# Patient Record
Sex: FEMALE | Race: WHITE | NOT HISPANIC OR LATINO | Employment: FULL TIME | URBAN - METROPOLITAN AREA
[De-identification: names, ages, dates, MRNs, and addresses within clinical notes are randomized per-mention and may not be internally consistent; named-entity substitution may affect disease eponyms.]

---

## 2017-03-07 ENCOUNTER — GENERIC CONVERSION - ENCOUNTER (OUTPATIENT)
Dept: OTHER | Facility: OTHER | Age: 57
End: 2017-03-07

## 2017-04-12 ENCOUNTER — GENERIC CONVERSION - ENCOUNTER (OUTPATIENT)
Dept: OTHER | Facility: OTHER | Age: 57
End: 2017-04-12

## 2017-05-30 ENCOUNTER — ALLSCRIPTS OFFICE VISIT (OUTPATIENT)
Dept: OTHER | Facility: OTHER | Age: 57
End: 2017-05-30

## 2017-05-30 DIAGNOSIS — Z86.2 PERSONAL HISTORY OF DISEASES OF THE BLOOD AND BLOOD-FORMING ORGANS AND CERTAIN DISORDERS INVOLVING THE IMMUNE MECHANISM: ICD-10-CM

## 2017-05-30 DIAGNOSIS — Z86.59 PERSONAL HISTORY OF OTHER MENTAL AND BEHAVIORAL DISORDERS: ICD-10-CM

## 2017-05-30 DIAGNOSIS — E78.00 PURE HYPERCHOLESTEROLEMIA: ICD-10-CM

## 2017-05-30 DIAGNOSIS — J45.990 EXERCISE-INDUCED BRONCHOSPASM: ICD-10-CM

## 2017-05-30 DIAGNOSIS — F41.9 ANXIETY DISORDER: ICD-10-CM

## 2017-05-30 DIAGNOSIS — J30.9 ALLERGIC RHINITIS: ICD-10-CM

## 2017-06-10 ENCOUNTER — LAB CONVERSION - ENCOUNTER (OUTPATIENT)
Dept: OTHER | Facility: OTHER | Age: 57
End: 2017-06-10

## 2017-06-10 LAB
CHOLEST SERPL-MCNC: 228 MG/DL (ref 125–200)
CHOLEST/HDLC SERPL: 3.2 (CALC)
HDLC SERPL-MCNC: 72 MG/DL
LDL CHOLESTEROL (HISTORICAL): 139 MG/DL (CALC)
NON-HDL-CHOL (CHOL-HDL) (HISTORICAL): 156 MG/DL (CALC)
TRIGL SERPL-MCNC: 85 MG/DL

## 2017-06-12 ENCOUNTER — LAB CONVERSION - ENCOUNTER (OUTPATIENT)
Dept: OTHER | Facility: OTHER | Age: 57
End: 2017-06-12

## 2017-06-12 LAB
A/G RATIO (HISTORICAL): 1.6 (CALC) (ref 1–2.5)
ALBUMIN SERPL BCP-MCNC: 4 G/DL (ref 3.6–5.1)
ALP SERPL-CCNC: 71 U/L (ref 33–130)
ALT SERPL W P-5'-P-CCNC: 13 U/L (ref 6–29)
AST SERPL W P-5'-P-CCNC: 16 U/L (ref 10–35)
BASOPHILS # BLD AUTO: 0.5 %
BASOPHILS # BLD AUTO: 20 CELLS/UL (ref 0–200)
BILIRUB SERPL-MCNC: 0.4 MG/DL (ref 0.2–1.2)
BUN SERPL-MCNC: 10 MG/DL (ref 7–25)
BUN/CREA RATIO (HISTORICAL): NORMAL (CALC) (ref 6–22)
CALCIUM SERPL-MCNC: 8.9 MG/DL (ref 8.6–10.4)
CHLORIDE SERPL-SCNC: 102 MMOL/L (ref 98–110)
CHOLEST SERPL-MCNC: 228 MG/DL (ref 125–200)
CHOLEST/HDLC SERPL: 3.2 (CALC)
CO2 SERPL-SCNC: 29 MMOL/L (ref 20–31)
CREAT SERPL-MCNC: 0.77 MG/DL (ref 0.5–1.05)
DEPRECATED RDW RBC AUTO: 15.4 % (ref 11–15)
EGFR AFRICAN AMERICAN (HISTORICAL): 101 ML/MIN/1.73M2
EGFR-AMERICAN CALC (HISTORICAL): 88 ML/MIN/1.73M2
EOSINOPHIL # BLD AUTO: 112 CELLS/UL (ref 15–500)
EOSINOPHIL # BLD AUTO: 2.8 %
FERRITIN SERPL-MCNC: 15 NG/ML (ref 10–232)
GAMMA GLOBULIN (HISTORICAL): 2.5 G/DL (CALC) (ref 1.9–3.7)
GLUCOSE (HISTORICAL): 81 MG/DL (ref 65–99)
HCT VFR BLD AUTO: 39.8 % (ref 35–45)
HDLC SERPL-MCNC: 72 MG/DL
HGB BLD-MCNC: 12.9 G/DL (ref 11.7–15.5)
IRON SATN MFR SERPL: 31 % (CALC) (ref 11–50)
IRON SERPL-MCNC: 121 MCG/DL (ref 45–160)
LDL CHOLESTEROL (HISTORICAL): 139 MG/DL (CALC)
LYMPHOCYTES # BLD AUTO: 1136 CELLS/UL (ref 850–3900)
LYMPHOCYTES # BLD AUTO: 28.4 %
MCH RBC QN AUTO: 28.6 PG (ref 27–33)
MCHC RBC AUTO-ENTMCNC: 32.5 G/DL (ref 32–36)
MCV RBC AUTO: 87.9 FL (ref 80–100)
MONOCYTES # BLD AUTO: 316 CELLS/UL (ref 200–950)
MONOCYTES (HISTORICAL): 7.9 %
NEUTROPHILS # BLD AUTO: 2416 CELLS/UL (ref 1500–7800)
NEUTROPHILS # BLD AUTO: 60.4 %
NON-HDL-CHOL (CHOL-HDL) (HISTORICAL): 156 MG/DL (CALC)
PLATELET # BLD AUTO: 166 THOUSAND/UL (ref 140–400)
PMV BLD AUTO: 10.4 FL (ref 7.5–12.5)
POTASSIUM SERPL-SCNC: 4.1 MMOL/L (ref 3.5–5.3)
RBC # BLD AUTO: 4.52 MILLION/UL (ref 3.8–5.1)
RBC MORPHOLOGY (HISTORICAL): ABNORMAL
SODIUM SERPL-SCNC: 138 MMOL/L (ref 135–146)
TIBC SERPL-MCNC: 393 MCG/DL (CALC) (ref 250–450)
TOTAL PROTEIN (HISTORICAL): 6.5 G/DL (ref 6.1–8.1)
TRIGL SERPL-MCNC: 85 MG/DL
WBC # BLD AUTO: 4 THOUSAND/UL (ref 3.8–10.8)

## 2017-06-13 ENCOUNTER — GENERIC CONVERSION - ENCOUNTER (OUTPATIENT)
Dept: OTHER | Facility: OTHER | Age: 57
End: 2017-06-13

## 2017-09-22 ENCOUNTER — GENERIC CONVERSION - ENCOUNTER (OUTPATIENT)
Dept: OTHER | Facility: OTHER | Age: 57
End: 2017-09-22

## 2017-10-23 ENCOUNTER — ALLSCRIPTS OFFICE VISIT (OUTPATIENT)
Dept: OTHER | Facility: OTHER | Age: 57
End: 2017-10-23

## 2017-10-23 DIAGNOSIS — Z86.2 PERSONAL HISTORY OF DISEASES OF THE BLOOD AND BLOOD-FORMING ORGANS AND CERTAIN DISORDERS INVOLVING THE IMMUNE MECHANISM (CODE): ICD-10-CM

## 2017-10-24 NOTE — PROGRESS NOTES
Assessment  1  Asthma, exercise induced (493 81) (J45 990)   2  Flu vaccine need (V04 81) (Z23)   3  Anxiety (300 00) (F41 9)   4  Allergic rhinitis (477 9) (J30 9)   · sees ENT, Dr Mike Amado & on immunotherapy    Plan  History of anemia, Iron deficiency anemia, unspecified iron deficiency    · (1) CBC/PLT/DIFF; Status:Active; Requested TUQ:[id number];     Discussion/Summary  Discussion Summary:   1  blood testkeep up good workflu vaccine todayreturn to office in 2018  Counseling Documentation With Imm: The patient was counseled regarding instructions for management,-- patient and family education,-- impressions,-- risks and benefits of treatment options  Medication SE Review and Pt Understands Tx: Possible side effects of new medications were reviewed with the patient/guardian today  The treatment plan was reviewed with the patient/guardian  The patient/guardian understands and agrees with the treatment plan      Chief Complaint  Chief Complaint Chronic Condition [place]: Patient is here today for follow up of chronic conditions described in HPI  History of Present Illness  HPI: 57 y/o F here for follow up  above - reviewed meds with patientcolonoscopy and found several polyps ,due for f/u in 2 years  lately and lost weight, looking to lose moreasthma sx & taking singulair without SE  flu vaccine today  any bleeding sx except missed 3 periods in a row/dipesh -menopausal and having bleeding with sex  Seeing [facility] for work up of this   sertraline for 1 month, was weaning off but her father-in-law  and she re-started rxxanax only as needed but not recently, had recent refill  any other complaints      Review of Systems  Complete-Female:   Constitutional: no fever  Eyes: no eyesight problems  The patient presents with complaints of nasal discharge  Symptoms are improving  Cardiovascular: no chest pain  Respiratory: no cough  Gastrointestinal: no abdominal pain  Genitourinary: no dysuria  Integumentary: no rashes  Neurological: no confusion  Psychiatric: no depression--    The patient presents with complaints of anxiety  Symptoms are improved by medications  Symptoms are improving  ROS Reviewed:   ROS reviewed  Active Problems  1  Achilles tendinitis of right lower extremity (726 71) (M76 61)   2  Allergic rhinitis (477 9) (J30 9)   3  Anxiety (300 00) (F41 9)   4  Asthma, exercise induced (493 81) (J45 990)   5  BMI 32 0-32 9,adult (V85 32) (Z68 32)   6  Cervical cancer screening (V76 2) (Z12 4)   7  Eczema of both hands (692 9) (L30 9)   8  Encounter for screening mammogram for breast cancer (V76 12) (Z12 31)   9  Hand cramp (729 82) (R25 2)   10  History of anemia (V12 3) (Z86 2)   11  History of depression (V11 8) (Z86 59)   12  History of oral aphthous ulcers (V12 79) (Z87 19)   13  History of sinusitis (V12 69) (Z87 09)   14  Hypercholesterolemia (272 0) (E78 00)   15  Iron deficiency anemia, unspecified iron deficiency   16  Joint stiffness of hand (719 54) (M25 649)   17  Leukopenia (288 50) (D72 819)   18  Need for prophylactic vaccination and inoculation against influenza (V04 81) (Z23)   19  Other specified menopausal and perimenopausal disorders (627 8) (N95 8)   20  Overweight (278 02) (E66 3)   21  History of Pruritus (698 9) (L29 9)   22  Right shoulder pain (719 41) (M25 511)   23  Screening for breast cancer (V76 10) (Z12 31)   24  Sprain of right ankle, unspecified ligament, initial encounter (845 00) (S93 401A)   25  History of Stressful life event affecting family (V61 09) (Z63 79)   32  Vitamin D insufficiency (268 9) (E55 9)   27  Witnessed apneic spells (786 03) (R06 81)    Past Medical History  1  History of Daytime sleepiness (780 54) (R40 0)   2  History of acute bronchitis (V12 69) (Z87 09)   3  History of gastritis (V12 79) (Z87 19)   4  History of migraine (V12 49) (Z86 69)   5  History of oral aphthous ulcers (V12 79) (Z87 19)   6   History of sinusitis (V12 69) (Z87 09)   7  History of Joint pain, knee (719 46) (M25 569)   8  Need for prophylactic vaccination and inoculation against influenza (V04 81) (Z23)   9  Other specified menopausal and perimenopausal disorders (627 8) (N95 8)   10  History of Pain in joint of right shoulder region (719 41) (M25 511)   11  History of Pruritus (698 9) (L29 9)   12  History of Snoring (786 09) (R06 83)   13  History of Tingling (782 0) (R20 2)   14  Urinary tract infection (599 0) (N39 0)   15  History of Wheezing (786 07) (R06 2)  Active Problems And Past Medical History Reviewed: The active problems and past medical history were reviewed and updated today  Surgical History  1  History of Arthroscopy Shoulder   2  History of Colonoscopy (Fiberoptic)   3  History of Diagnostic Esophagogastroduodenoscopy   4  History of Nasal Septal Deviation Repair   5  History of Shoulder Surgery   6  History of Tonsillectomy With Adenoidectomy    Family History  Mother    1  Family history of colon cancer (V16 0) (Z80 0)  Father    2  Family history of Carotid Artery Stenosis   3  Family history of Hypertension (V17 49)  Sister    4  Family history of Panic Disorder Without Agoraphobia    Social History   · Being A Social Drinker   · Marital History - Currently    · Never a smoker   · Occupation:   · Rarely consumes alcohol (V49 89) (Z78 9)   · History of Stressful life event affecting family (V61 09) (Z63 79)  Social History Reviewed: The social history was reviewed and updated today  Current Meds   1  LORazepam 0 5 MG Oral Tablet; TAKE 0 5 TABLET Daily as directed; Therapy: 07UDD7469 to (Evaluate:20Nov2017); Last Rx:11Oct2017 Ordered   2  Montelukast Sodium 10 MG Oral Tablet; TAKE 1 TABLET DAILY; Therapy: 03BSM7182 to (Evaluate:26Nov2017); Last Rx:[id number] Ordered   3  ProAir  (90 Base) MCG/ACT Inhalation Aerosol Solution; INHALE 2 PUFFS 15 MINUTES PRIOR   TO EXERCISE;    Therapy: 08KWK1981 to (Last :59FIO5186)  Requested for: 25ZYZ5641 Ordered   4  Rizatriptan Benzoate 10 MG Oral Tablet Disintegrating; PLACE 1 TABLET ON TONGUE AT ONSET OF   MIGRAINE  MAY REPEAT ONE IN 2 TO4 HOURS, MAXIMUM 2 PER DAY; Therapy: 66OXR5851 to (Last Rx:62Etx2142)  Requested for: 99Zki2782 Ordered   5  Sertraline HCl - 25 MG Oral Tablet; Take 1 tablet daily; Therapy: 28RCT5136 to (53 459 91 54)  Requested for: 84Jrn3359; Last Rx:[id number]   Ordered  Medication List Reviewed: The medication list was reviewed and updated today  Allergies  1  No Known Drug Allergies    Vitals  Vital Signs    Recorded: 23Oct2017 11:07AM   Temperature 98 5 F   Heart Rate 88   Respiration 18   Systolic 402   Diastolic 82   Height 5 ft 3 in   Weight 168 lb 6 oz   BMI Calculated 29 83   BSA Calculated 1 8   O2 Saturation 98     Physical Exam    Constitutional   General appearance: No acute distress, well appearing and well nourished  Eyes   Pupils and irises: Equal, round, reactive to light  Ears, Nose, Mouth, and Throat   Oropharynx: Normal with no erythema, edema, exudate or lesions  Pulmonary   Respiratory effort: No increased work of breathing or signs of respiratory distress  Auscultation of lungs: Clear to auscultation  Cardiovascular   Auscultation of heart: Normal rate and rhythm, normal S1 and S2, no murmurs  Examination of extremities for edema and/or varicosities: Normal     Abdomen   Abdomen: Non-tender, no masses      Psychiatric   Judgment and insight: Normal     Mood and affect: Normal        Results/Data  COLONOSCOPY 22Sep2017 12:00AM Flonnie Prescott     Test Name Result Flag Reference   Colonoscopy      7 polyps due for repeat 2020     Summary / No summary entered :      No summary entered  Documents attached :      Seth Mccoy; Enc: [id number] - Image Encounter - Flonnie Prescott -      (Internal Medicine) (Result Document)  Provider Comments  Provider Comments:   repeat CBC in november if still anemic, may need further work up  3/2018 or prn      Future Appointments    Date/Time Provider Specialty Site   04/23/2018 08:00 AM Judah Cano DO Internal Medicine [address] [facility] INTERNAL MED     Signatures   Electronically signed by : Oma Hernández DO; Oct 23 2017 12:18PM EST                       (Author)

## 2017-12-04 ENCOUNTER — GENERIC CONVERSION - ENCOUNTER (OUTPATIENT)
Dept: OTHER | Facility: OTHER | Age: 57
End: 2017-12-04

## 2018-01-12 NOTE — RESULT NOTES
Message   let patient know - shoulder x-ray looks fine, recommend to continue with current treatment plan     Verified Results  * XR SHOULDER 2+ VIEW RIGHT [id number] 12:07PM [facility] [place] [facility] Order Number: [id number]   Performing Comments: right shoulder pain for weeks, suspect rotator cuff injury     Test Name Result Flag Reference   XR SHOULDER 2+ VW RIGHT (Report)     RIGHT SHOULDER     INDICATION: Posterior pain 3 months     COMPARISON: None     VIEWS: 3; 3 images     FINDINGS:     There is no acute fracture or dislocation  Postsurgical changes are seen to the distal clavicle  The glenohumeral joint is unremarkable  No lytic or blastic lesions are seen  Soft tissues are unremarkable  IMPRESSION:     No acute osseous abnormality         Workstation performed: [id number]     Signed by:   Fuentes Saab MD   3/11/16

## 2018-01-14 VITALS
DIASTOLIC BLOOD PRESSURE: 82 MMHG | BODY MASS INDEX: 29.84 KG/M2 | HEIGHT: 63 IN | TEMPERATURE: 98.5 F | HEART RATE: 88 BPM | OXYGEN SATURATION: 98 % | RESPIRATION RATE: 18 BRPM | SYSTOLIC BLOOD PRESSURE: 120 MMHG | WEIGHT: 168.38 LBS

## 2018-01-14 NOTE — RESULT NOTES
Verified Results  COLONOSCOPY [id number] 12:00AM Leydi Velásquez     Test Name Result Flag Reference   Colonoscopy      7 polyps due for repeat 2020      Summary / No summary entered :      No summary entered   Documents attached :      Chris Gonzales; Enc: [id number] - Image Encounter -      Leydi Velásquez - (Internal Medicine) (Result Document)

## 2018-01-15 VITALS
HEART RATE: 82 BPM | BODY MASS INDEX: 32.09 KG/M2 | WEIGHT: 181.13 LBS | DIASTOLIC BLOOD PRESSURE: 80 MMHG | OXYGEN SATURATION: 98 % | SYSTOLIC BLOOD PRESSURE: 122 MMHG | TEMPERATURE: 97.9 F | RESPIRATION RATE: 18 BRPM | HEIGHT: 63 IN

## 2018-01-16 NOTE — RESULT NOTES
Verified Results  (1) COMPREHENSIVE METABOLIC PANEL [id number] 01:76NQ Taco Engle     Test Name Result Flag Reference   GLUCOSE 81 mg/dL  65-99   Fasting reference interval   UREA NITROGEN (BUN) 10 mg/dL  7-25   CREATININE 0 77 mg/dL  0 50-1 05   For patients >52years of age, the reference limit  for Creatinine is approximately 13% higher for people  identified as -American  eGFR NON-AFR   AMERICAN 88 mL/min/1 73m2  > OR = 60   eGFR AFRICAN AMERICAN 101 mL/min/1 73m2  > OR = 60   BUN/CREATININE RATIO   4-68   NOT APPLICABLE (calc)   SODIUM 138 mmol/L  135-146   POTASSIUM 4 1 mmol/L  3 5-5 3   CHLORIDE 102 mmol/L     CARBON DIOXIDE 29 mmol/L  20-31   CALCIUM 8 9 mg/dL  8 6-10 4   PROTEIN, TOTAL 6 5 g/dL  6 1-8 1   ALBUMIN 4 0 g/dL  3 6-5 1   GLOBULIN 2 5 g/dL (calc)  1 9-3 7   ALBUMIN/GLOBULIN RATIO 1 6 (calc)  1 0-2 5   BILIRUBIN, TOTAL 0 4 mg/dL  0 2-1 2   ALKALINE PHOSPHATASE 71 U/L     AST 16 U/L  10-35   ALT 13 U/L  6-29     (1) IRON SATURATION %, TIBC [id number] 07:27AM Naresh Diamond     Test Name Result Flag Reference   IRON, TOTAL 121 mcg/dL     IRON BINDING CAPACITY 393 mcg/dL (calc)  250-450   % SATURATION 31 % (calc)  11-50     (1) CBC/PLT/DIFF [id number] 07:27AM Naresh Diamond     Test Name Result Flag Reference   WHITE BLOOD CELL COUNT 4 0 Thousand/uL  3 8-10 8   RED BLOOD CELL COUNT 4 52 Million/uL  3 80-5 10   HEMOGLOBIN 12 9 g/dL  11 7-15 5   HEMATOCRIT 39 8 %  35 0-45 0   MCV 87 9 fL  80 0-100 0   MCH 28 6 pg  27 0-33 0   MCHC 32 5 g/dL  32 0-36 0   RDW 15 4 % H 11 0-15 0   PLATELET COUNT 758 Thousand/uL  140-400   ABSOLUTE NEUTROPHILS 2416 cells/uL  6487-1199   ABSOLUTE LYMPHOCYTES 1136 cells/uL  850-3900   ABSOLUTE MONOCYTES 316 cells/uL  200-950   ABSOLUTE EOSINOPHILS 112 cells/uL     ABSOLUTE BASOPHILS 20 cells/uL  0-200   NEUTROPHILS 60 4 %     LYMPHOCYTES 28 4 %     MONOCYTES 7 9 %     EOSINOPHILS 2 8 %     BASOPHILS 0 5 %     CBC MORPHOLOGY   NORMAL   Red cell morphology appears unremarkable   MPV 10 4 fL  7 5-12 5     (Q) LIPID PANEL WITH REFLEX TO DIRECT LDL [id number] 07:27AM Ashley Siva     Test Name Result Flag Reference   CHOLESTEROL, TOTAL 228 mg/dL H 125-200   HDL CHOLESTEROL 72 mg/dL  > OR = 46   TRIGLICERIDES 85 mg/dL  <237   LDL-CHOLESTEROL 139 mg/dL (calc) H <130   Desirable range <100 mg/dL for patients with CHD or  diabetes and <70 mg/dL for diabetic patients with  known heart disease  CHOL/HDLC RATIO 3 2 (calc)  < OR = 5 0   NON HDL CHOLESTEROL 156 mg/dL (calc)     Target for non-HDL cholesterol is 30 mg/dL higher than   LDL cholesterol target  (Q) LIPID PANEL WITH REFLEX TO DIRECT LDL [id number] 07:27AM Ashley Siva     Test Name Result Flag Reference   CHOLESTEROL, TOTAL 228 mg/dL H 125-200   HDL CHOLESTEROL 72 mg/dL  > OR = 46   TRIGLICERIDES 85 mg/dL  <744   LDL-CHOLESTEROL 139 mg/dL (calc) H <130   Desirable range <100 mg/dL for patients with CHD or  diabetes and <70 mg/dL for diabetic patients with  known heart disease  CHOL/HDLC RATIO 3 2 (calc)  < OR = 5 0   NON HDL CHOLESTEROL 156 mg/dL (calc)     Target for non-HDL cholesterol is 30 mg/dL higher than   LDL cholesterol target       (1) OP SHERRI FERRITIN [id number] 07:27AM Ashley Paniagua   REPORT COMMENT:  FASTING:YES     Test Name Result Flag Reference   FERRITIN 15 ng/mL

## 2018-01-24 VITALS
TEMPERATURE: 98 F | HEIGHT: 63 IN | DIASTOLIC BLOOD PRESSURE: 76 MMHG | BODY MASS INDEX: 30.12 KG/M2 | HEART RATE: 76 BPM | RESPIRATION RATE: 18 BRPM | WEIGHT: 170 LBS | OXYGEN SATURATION: 98 % | SYSTOLIC BLOOD PRESSURE: 122 MMHG

## 2018-02-03 LAB
BASOPHILS # BLD AUTO: 42 CELLS/UL (ref 0–200)
BASOPHILS NFR BLD AUTO: 1.2 %
EOSINOPHIL # BLD AUTO: 123 CELLS/UL (ref 15–500)
EOSINOPHIL NFR BLD AUTO: 3.5 %
ERYTHROCYTE [DISTWIDTH] IN BLOOD BY AUTOMATED COUNT: 12.6 % (ref 11–15)
HCT VFR BLD AUTO: 43.5 % (ref 35–45)
HGB BLD-MCNC: 14.1 G/DL (ref 11.7–15.5)
LYMPHOCYTES # BLD AUTO: 1397 CELLS/UL (ref 850–3900)
LYMPHOCYTES NFR BLD AUTO: 39.9 %
MCH RBC QN AUTO: 29 PG (ref 27–33)
MCHC RBC AUTO-ENTMCNC: 32.4 G/DL (ref 32–36)
MCV RBC AUTO: 89.5 FL (ref 80–100)
MONOCYTES # BLD AUTO: 273 CELLS/UL (ref 200–950)
MONOCYTES NFR BLD AUTO: 7.8 %
NEUTROPHILS # BLD AUTO: 1666 CELLS/UL (ref 1500–7800)
NEUTROPHILS NFR BLD AUTO: 47.6 %
PLATELET # BLD AUTO: 172 THOUSAND/UL (ref 140–400)
PMV BLD REES-ECKER: 12.9 FL (ref 7.5–12.5)
RBC # BLD AUTO: 4.86 MILLION/UL (ref 3.8–5.1)
WBC # BLD AUTO: 3.5 THOUSAND/UL (ref 3.8–10.8)

## 2018-02-07 ENCOUNTER — TELEPHONE (OUTPATIENT)
Dept: INTERNAL MEDICINE CLINIC | Facility: CLINIC | Age: 58
End: 2018-02-07

## 2018-02-07 NOTE — TELEPHONE ENCOUNTER
----- Message from Julito Denton DO sent at 2/7/2018 10:14 AM EST -----  Blood work is back and blood count looks fine/no anemia    Let's f/u at scheduled appt in April

## 2018-03-08 DIAGNOSIS — F41.9 ANXIETY: Primary | ICD-10-CM

## 2018-03-08 RX ORDER — SERTRALINE HYDROCHLORIDE 25 MG/1
TABLET, FILM COATED ORAL
Qty: 90 TABLET | Refills: 1 | Status: SHIPPED | OUTPATIENT
Start: 2018-03-08 | End: 2018-08-15 | Stop reason: SDUPTHER

## 2018-04-10 DIAGNOSIS — G43.909 MIGRAINE WITHOUT STATUS MIGRAINOSUS, NOT INTRACTABLE, UNSPECIFIED MIGRAINE TYPE: Primary | ICD-10-CM

## 2018-04-10 RX ORDER — RIZATRIPTAN BENZOATE 10 MG/1
TABLET, ORALLY DISINTEGRATING ORAL
Qty: 27 TABLET | Refills: 1 | Status: SHIPPED | OUTPATIENT
Start: 2018-04-10 | End: 2018-11-26 | Stop reason: SDUPTHER

## 2018-04-21 PROBLEM — M51.26 LUMBAR HERNIATED DISC: Status: ACTIVE | Noted: 2017-12-04

## 2018-04-23 ENCOUNTER — OFFICE VISIT (OUTPATIENT)
Dept: INTERNAL MEDICINE CLINIC | Facility: CLINIC | Age: 58
End: 2018-04-23
Payer: COMMERCIAL

## 2018-04-23 VITALS
BODY MASS INDEX: 32.5 KG/M2 | RESPIRATION RATE: 16 BRPM | HEART RATE: 76 BPM | WEIGHT: 183.4 LBS | HEIGHT: 63 IN | DIASTOLIC BLOOD PRESSURE: 82 MMHG | SYSTOLIC BLOOD PRESSURE: 128 MMHG | OXYGEN SATURATION: 98 %

## 2018-04-23 DIAGNOSIS — G43.909 MIGRAINE WITHOUT STATUS MIGRAINOSUS, NOT INTRACTABLE, UNSPECIFIED MIGRAINE TYPE: ICD-10-CM

## 2018-04-23 DIAGNOSIS — F41.9 ANXIETY: Primary | ICD-10-CM

## 2018-04-23 DIAGNOSIS — E78.00 HYPERCHOLESTEROLEMIA: ICD-10-CM

## 2018-04-23 DIAGNOSIS — J30.2 SEASONAL ALLERGIC RHINITIS, UNSPECIFIED TRIGGER: ICD-10-CM

## 2018-04-23 DIAGNOSIS — R79.89 LOW VITAMIN D LEVEL: ICD-10-CM

## 2018-04-23 PROBLEM — N95.1 PERI-MENOPAUSE: Status: ACTIVE | Noted: 2018-04-23

## 2018-04-23 PROCEDURE — 99214 OFFICE O/P EST MOD 30 MIN: CPT | Performed by: INTERNAL MEDICINE

## 2018-04-23 RX ORDER — MONTELUKAST SODIUM 10 MG/1
TABLET ORAL
COMMUNITY
Start: 2017-01-01 | End: 2019-03-11 | Stop reason: SDUPTHER

## 2018-04-23 NOTE — PROGRESS NOTES
Assessment/Plan:     Diagnoses and all orders for this visit:    Anxiety  Comments:  sx improved with SSRI, continue for now  pt may call for dose increase should menopausal sx worsen    Seasonal allergic rhinitis, unspecified trigger  Comments:  sees allergy/ENT and taking singulair with some relief  discussed 2nd gen AH and nasal corticosteroids as adjuunctive rx, defer to allergy    Hypercholesterolemia  Comments:  re-check FLP this summer  Orders:  -     Comprehensive metabolic panel; Future  -     Lipid Panel with Direct LDL reflex; Future    Migraine without status migrainosus, not intractable, unspecified migraine type  Comments:  sx controlled with prn maxalt -> continue for now    Low vitamin D level  Comments:  re-check Vit D, may need vit D rx  Orders:  -     Vitamin D 25 hydroxy    Other orders  -     montelukast (SINGULAIR) 10 mg tablet;           Subjective:      Patient ID: Brad Dominguez is a 56 y o  female  HPI    Here for follow up  Reviewed meds with patient  Takes maxalt for migraines with relief of sx and no side effects  UTD on mammogram   Going thru dipesh-menopause and having some more stress, taking sertraline at 25mg with relief  Had some changes at work which helped her stress level  Sees allergy/ENT for seasonal allergies with improvement over the years and taking singulair with benefit  No other complaints  Past Medical History:   Diagnosis Date    Daytime sleepiness     resolved 05/30/2017    History of oral aphthous ulcers     last assessed 04/01/2013    Migraine     last assessed 11/28/2016    Other specified menopausal and perimenopausal disorders (CODE)     last assessed 10/25/2013    Snoring     last assessed 08/29/2012    Wheezing     resolved 10/27/2014     Vitals:    04/23/18 0757   BP: 128/82   Pulse: 76   Resp: 16   SpO2: 98%   Weight: 83 2 kg (183 lb 6 4 oz)   Height: 5' 3" (1 6 m)     Body mass index is 32 49 kg/m²      Current Outpatient Prescriptions:    montelukast (SINGULAIR) 10 mg tablet, , Disp: , Rfl:     rizatriptan (MAXALT-MLT) 10 MG disintegrating tablet, PLACE 1 TABLET ON TONGUE AT ONSET OF MIGRAINE, MAY REPEAT 1 TABLET IN 2 TO 4 HOURS  MAXIMUM OF 2 TABLETS PER DAY , Disp: 27 tablet, Rfl: 1    sertraline (ZOLOFT) 25 mg tablet, TAKE 1 TABLET DAILY, Disp: 90 tablet, Rfl: 1  No Known Allergies      Review of Systems   Constitutional: Negative for fever  HENT: Negative for congestion  Respiratory: Negative for shortness of breath  Cardiovascular: Negative for chest pain  Gastrointestinal: Negative for abdominal pain  Genitourinary: Negative for difficulty urinating  Musculoskeletal: Negative for arthralgias  Neurological: Positive for headaches (but improved with maxalt)  Psychiatric/Behavioral: Negative for dysphoric mood  Objective:      /82   Pulse 76   Resp 16   Ht 5' 3" (1 6 m)   Wt 83 2 kg (183 lb 6 4 oz)   SpO2 98%   BMI 32 49 kg/m²          Physical Exam   Constitutional: She is oriented to person, place, and time  She appears well-developed and well-nourished  HENT:   Head: Normocephalic and atraumatic  Eyes: Conjunctivae are normal  Pupils are equal, round, and reactive to light  Cardiovascular: Normal rate, regular rhythm and normal heart sounds  No murmur heard  Pulmonary/Chest: Effort normal and breath sounds normal  She has no wheezes  She has no rales  Abdominal: Soft  Bowel sounds are normal  There is no tenderness  Musculoskeletal: She exhibits no edema  Neurological: She is alert and oriented to person, place, and time  Psychiatric: She has a normal mood and affect  Her behavior is normal    Vitals reviewed

## 2018-06-04 ENCOUNTER — TELEPHONE (OUTPATIENT)
Dept: INTERNAL MEDICINE CLINIC | Facility: CLINIC | Age: 58
End: 2018-06-04

## 2018-06-04 DIAGNOSIS — G89.29 CHRONIC NECK PAIN: Primary | ICD-10-CM

## 2018-06-04 DIAGNOSIS — M54.2 CHRONIC NECK PAIN: Primary | ICD-10-CM

## 2018-06-04 NOTE — TELEPHONE ENCOUNTER
Patient states her neck pain is becoming worse  States she believes she has a pinched nerve  Would like to know if can receive a doctor to doctor referral for Ortho

## 2018-06-18 ENCOUNTER — OFFICE VISIT (OUTPATIENT)
Dept: OBGYN CLINIC | Facility: OTHER | Age: 58
End: 2018-06-18
Payer: COMMERCIAL

## 2018-06-18 VITALS
HEIGHT: 65 IN | WEIGHT: 181 LBS | HEART RATE: 76 BPM | SYSTOLIC BLOOD PRESSURE: 124 MMHG | BODY MASS INDEX: 30.16 KG/M2 | DIASTOLIC BLOOD PRESSURE: 77 MMHG

## 2018-06-18 DIAGNOSIS — G89.29 CHRONIC NECK PAIN: ICD-10-CM

## 2018-06-18 DIAGNOSIS — M54.2 CHRONIC NECK PAIN: ICD-10-CM

## 2018-06-18 PROCEDURE — 99203 OFFICE O/P NEW LOW 30 MIN: CPT | Performed by: ORTHOPAEDIC SURGERY

## 2018-06-18 RX ORDER — METHYLPREDNISOLONE 4 MG/1
TABLET ORAL
Qty: 21 TABLET | Refills: 0 | Status: SHIPPED | OUTPATIENT
Start: 2018-06-18 | End: 2018-08-27

## 2018-06-18 RX ORDER — CYCLOBENZAPRINE HCL 5 MG
5 TABLET ORAL 3 TIMES DAILY PRN
Qty: 30 TABLET | Refills: 0 | Status: SHIPPED | OUTPATIENT
Start: 2018-06-18 | End: 2018-08-27

## 2018-06-18 NOTE — PROGRESS NOTES
Assessment/Plan:  Assessment/Plan   Diagnoses and all orders for this visit:    Chronic neck pain  -     Ambulatory referral to Orthopedic Surgery  -     Ambulatory referral to Physical Therapy; Future  -     cyclobenzaprine (FLEXERIL) 5 mg tablet; Take 1 tablet (5 mg total) by mouth 3 (three) times a day as needed for muscle spasms (muscle spasm)  -     Methylprednisolone 4 MG TBPK; Use as directed on package        We discussed with Ms Milan are findings being most consistent with paracervical muscle spasm  We recommended that she do a course of PT  She can use flexeril as needed for sleep  We have also prescribed a short course of oral steroids - medrol dose pack  F/u in     Chief Complaint: Neck Pain  Subjective:        Shadi David is a 56 y o  female (works as ) presents today for evaluation of neck pain, sent from her PCP  It has been present for approximately 4 months without preceding trauma  No numbness tingling  No arm/leg weakness  Greatest at paracervicals  Non-smoker  She has continued to work  She exercises/stretches several times per week  She also has used a TENS unit but was insufficient  She tried deep tissue massage, but also had insufficient relief   P r n  NSAIDs taken very infrequently provided moderate relief  She generally is quite aware of her neck posture and make sure her monitor at work is at eye level  She has had similar pain regardless of going away recently and sleeping at a hotel  She has tried different pillows as well  The following portions of the patient's history were reviewed and updated as appropriate: allergies, current medications, past family history, past medical history, past social history, past surgical history and problem list     Review of Systems   Constitutional: Negative for chills, fever and unexpected weight change  HENT: Negative for hearing loss, nosebleeds and sore throat      Eyes: Negative for pain, redness and visual disturbance  Respiratory: Negative for cough, shortness of breath and wheezing  Cardiovascular: Negative for chest pain, palpitations and leg swelling  Gastrointestinal: Negative for abdominal pain, nausea and vomiting  Endocrine: Negative for polydipsia and polyuria  Genitourinary: Negative for dysuria and hematuria  Musculoskeletal:        As noted in HPI   Skin: Negative for rash and wound  Neurological: Negative for dizziness, numbness and headaches  Psychiatric/Behavioral: Negative for decreased concentration  No depression, no anxiety       Objective:  Back Exam     Tenderness   Back tenderness location: paracervical musculature tender from C3-C7, along with muscle spasm  Range of Motion   Extension: 40   Flexion: 70   Lateral Bend Right: 40   Lateral Bend Left: 30   Rotation Right: 60   Rotation Left: 30     Reflexes   Biceps: normal    Other   Back sensation: sensation normal throughout upper extremities bilaterally  Erythema: no back redness    Comments:  Negative romero's bilaterally  Right sided spurling's produces neck pain, but no radiation or numbness/tingling, left-sided spurling's also painful without radiation or numbness/tingling  Negative neck distraction testing  5/5 strength with shoulder abduction, elbow flexion, elbow extension, wrist dorsiflexion, and finger abduction  Normal tricep reflex as well  Physical Exam   Constitutional: She is oriented to person, place, and time  She appears well-developed and well-nourished  No distress  HENT:   Head: Normocephalic and atraumatic  Eyes: Conjunctivae and EOM are normal  Pupils are equal, round, and reactive to light  Right eye exhibits no discharge  Left eye exhibits no discharge  No scleral icterus  Cardiovascular: Normal rate, regular rhythm and intact distal pulses  Pulmonary/Chest: Effort normal  No respiratory distress  Neurological: She is alert and oriented to person, place, and time  Skin: Skin is warm and dry  She is not diaphoretic  Psychiatric: She has a normal mood and affect  Her behavior is normal  Judgment and thought content normal    Vitals reviewed  No previous imaging available for review within last 2 years

## 2018-06-26 ENCOUNTER — EVALUATION (OUTPATIENT)
Dept: PHYSICAL THERAPY | Facility: OTHER | Age: 58
End: 2018-06-26
Payer: COMMERCIAL

## 2018-06-26 DIAGNOSIS — M54.2 CHRONIC NECK PAIN: Primary | ICD-10-CM

## 2018-06-26 DIAGNOSIS — G89.29 CHRONIC NECK PAIN: Primary | ICD-10-CM

## 2018-06-26 PROCEDURE — 97140 MANUAL THERAPY 1/> REGIONS: CPT | Performed by: PHYSICAL THERAPIST

## 2018-06-26 PROCEDURE — G8982 BODY POS GOAL STATUS: HCPCS | Performed by: PHYSICAL THERAPIST

## 2018-06-26 PROCEDURE — 97162 PT EVAL MOD COMPLEX 30 MIN: CPT | Performed by: PHYSICAL THERAPIST

## 2018-06-26 PROCEDURE — G8981 BODY POS CURRENT STATUS: HCPCS | Performed by: PHYSICAL THERAPIST

## 2018-06-26 PROCEDURE — 97110 THERAPEUTIC EXERCISES: CPT | Performed by: PHYSICAL THERAPIST

## 2018-06-26 NOTE — PROGRESS NOTES
PT Evaluation     Today's date: 2018  Patient name: Narciso Knowles  : 1960  MRN: [id number]  Referring provider: Amparo Ovalle MD  Dx:   Encounter Diagnosis     ICD-10-CM    1  Chronic neck pain M54 2 Ambulatory referral to Physical Therapy    G89 29                   Assessment/Plan    Subjective Evaluation    History of Present Illness  Mechanism of injury: Patient reports increased neck pain randomly one morning  She said that she just thought to herself "man, my neck hurts "  She started with the chiropractor but when it came to manipulating the cervical spine, she did not feel comfortable with the DC  Patient then tried deep tissue massage twice but it did not help  She finally went to Dr Dian Fitch who prescribed PT  No x-rays were administered  She was given prednisone and another medication that she does not recall the name      Not a recurrent problem   Quality of life: good    Pain  Current pain ratin  At best pain ratin  At worst pain ratin  Location: Neck  Quality: sharp, tight and dull ache  Relieving factors: change in position      Diagnostic Tests  No diagnostic tests performed  Treatments  Previous treatment: chiropractic and medication  Current treatment: physical therapy  Patient Goals  Patient goals for therapy: decreased pain, increased motion, increased strength, return to sport/leisure activities and independence with ADLs/IADLs  Patient goal: "I want to be pain free "         Objective    Flowsheet Rows      Most Recent Value   PT/OT G-Codes   Current Score  57   Projected Score  67   FOTO information reviewed  Yes   Assessment Type  Evaluation   G code set  Changing & Maintaining Body Position   Changing and Maintaining Body Position Current Status ()  CK   Changing and Maintaining Body Position Goal Status ()  CJ          Precautions:     Daily Treatment Diary     Manual Exercise Diary                                                                                                                                                                                                                                                                                      Modalities

## 2018-06-28 ENCOUNTER — OFFICE VISIT (OUTPATIENT)
Dept: PHYSICAL THERAPY | Facility: OTHER | Age: 58
End: 2018-06-28
Payer: COMMERCIAL

## 2018-06-28 DIAGNOSIS — G89.29 CHRONIC NECK PAIN: Primary | ICD-10-CM

## 2018-06-28 DIAGNOSIS — M54.2 CHRONIC NECK PAIN: Primary | ICD-10-CM

## 2018-06-28 PROCEDURE — 97110 THERAPEUTIC EXERCISES: CPT | Performed by: PHYSICAL THERAPIST

## 2018-06-28 PROCEDURE — 97140 MANUAL THERAPY 1/> REGIONS: CPT | Performed by: PHYSICAL THERAPIST

## 2018-06-28 NOTE — PROGRESS NOTES
Daily Note     Today's date: 2018  Patient name: Natalie Portillo  : 1960  MRN: [id number]  Referring provider: Juanito Hoyos MD  Dx:   Encounter Diagnosis     ICD-10-CM    1  Chronic neck pain M54 2     G89 29      1 on   [facility] [id number]  Start Time: 915  Stop Time: 945  Total time in clinic (min): 30 minutes    Subjective: Patient reports increase ROM from last session  She is still having pain though  Patient reported relief with cervical MWM for rotation and extension  Objective: See treatment diary below      Assessment: Tolerated treatment well  Patient demonstrated fatigue post treatment, exhibited good technique with therapeutic exercises and would benefit from continued PT      Plan: Continue per plan of care  Progress treatment as tolerated         Precautions: N/A     Specialty Daily Treatment Diary     Manual         SOR  [facility]       Towel Cervical Distraction [facility]       Cervical MET [facility]                           Exercise Diary         Chin Tucks on FR 15 x 5"       Chin Tucks on FR with Rot 15 x 5"       3 Finger Rot 15 x 5" ea       MWM Ext 10 x 3"        MWM Rot 10 x 3" ea       FR Protocol  7 x 1'                                                                                                                            Modalities         10'

## 2018-07-02 ENCOUNTER — OFFICE VISIT (OUTPATIENT)
Dept: PHYSICAL THERAPY | Facility: OTHER | Age: 58
End: 2018-07-02
Payer: COMMERCIAL

## 2018-07-02 DIAGNOSIS — M54.2 CHRONIC NECK PAIN: Primary | ICD-10-CM

## 2018-07-02 DIAGNOSIS — G89.29 CHRONIC NECK PAIN: Primary | ICD-10-CM

## 2018-07-02 PROCEDURE — 97140 MANUAL THERAPY 1/> REGIONS: CPT

## 2018-07-02 PROCEDURE — 97112 NEUROMUSCULAR REEDUCATION: CPT

## 2018-07-02 PROCEDURE — 97110 THERAPEUTIC EXERCISES: CPT

## 2018-07-02 NOTE — PROGRESS NOTES
Daily Note     Today's date: 2018  Patient name: Angel Christianson  : 1960  MRN: [id number]  Referring provider: Hesham Madrigal MD  Dx:   Encounter Diagnosis     ICD-10-CM    1  Chronic neck pain M54 2     G89 29                   Subjective: Patient reports she was feeling a bit better until yesterday she started aching again  Objective: See treatment diary below      Assessment: Initiated treatment with  this visit  Patient progresses through TE with no difficulty but remains limited in rotation  Rotation has improved but she still has pain at end range  Plan: Continue per plan of care       Precautions: N/A      Specialty Daily Treatment Diary      Manual           [facility]         Towel Cervical Distraction GRC  RM         Cervical MET GR                                             Exercise Diary           Chin Tucks on FR 15 x 5"  15 x 5"         Chin Tucks on FR with Rot 15 x 5"  15 x 5"         3 Finger Rot 15 x 5" ea  20x 5"         MWM Ext 10 x 3"   10 x 5" ea         MWM Rot 10 x 3" ea  10 x 5" ea         FR Protocol  7 x 1'   7x1'                                                                                                                                                                                                                   Modalities           10'   10'

## 2018-07-05 ENCOUNTER — OFFICE VISIT (OUTPATIENT)
Dept: PHYSICAL THERAPY | Facility: OTHER | Age: 58
End: 2018-07-05
Payer: COMMERCIAL

## 2018-07-05 DIAGNOSIS — M54.2 CHRONIC NECK PAIN: Primary | ICD-10-CM

## 2018-07-05 DIAGNOSIS — G89.29 CHRONIC NECK PAIN: Primary | ICD-10-CM

## 2018-07-05 PROCEDURE — 97140 MANUAL THERAPY 1/> REGIONS: CPT

## 2018-07-05 NOTE — PROGRESS NOTES
Daily Note     Today's date: 2018  Patient name: Kim Stevenson  : 1960  MRN: [id number]  Referring provider: Cathleen Barker MD  Dx:   Encounter Diagnosis     ICD-10-CM    1  Chronic neck pain M54 2     G89 29          7:35-7:45  1:1 with PTA CR 7:45- 8:10  Unbilled 8:10- 8:40  Subjective: " I'm very sore today " Pain /10  Feels AROM is improving but still very painful  Objective: See treatment diary below      Assessment: Progressed to postural strengthening utilizing TB; assess response NV  Manual isometrics performed by PT [facility] with discomfort into left sidebend  Plan: Continue per plan of care       Precautions: N/A      Specialty Daily Treatment Diary      Manual         SOR  GRC  RM  CR       Towel Cervical Distraction GRC  RM  --       Cervical MET [facility]        manual isometrics     [facility]                           Exercise Diary    7/5       Chin Tucks on FR 15 x 5"  15 x 5"  5"x15       Chin Tucks on FR with Rot 15 x 5"  15 x 5"  5"x15       3 Finger Rot 15 x 5" ea  20x 5"  5"x15 ea        MWM Ext 10 x 3"   10 x 5" ea  5"x10       MWM Rot 10 x 3" ea  10 x 5" ea  5"x10 ea        FR Protocol  7 x 1'   7x1'  7x1' ea         TB Rows      GTB  5"x15        TB low rows      OTB  5"x15        TB W's      OTB  5"x15                                                                                                                                                                       Modalities   7/5        10'   10'  10 mins

## 2018-07-09 ENCOUNTER — OFFICE VISIT (OUTPATIENT)
Dept: PHYSICAL THERAPY | Facility: OTHER | Age: 58
End: 2018-07-09
Payer: COMMERCIAL

## 2018-07-09 DIAGNOSIS — M54.2 CHRONIC NECK PAIN: Primary | ICD-10-CM

## 2018-07-09 DIAGNOSIS — G89.29 CHRONIC NECK PAIN: Primary | ICD-10-CM

## 2018-07-09 PROCEDURE — 97010 HOT OR COLD PACKS THERAPY: CPT | Performed by: PEDIATRICS

## 2018-07-09 PROCEDURE — 97140 MANUAL THERAPY 1/> REGIONS: CPT | Performed by: PEDIATRICS

## 2018-07-09 PROCEDURE — 97112 NEUROMUSCULAR REEDUCATION: CPT | Performed by: PEDIATRICS

## 2018-07-09 NOTE — PROGRESS NOTES
Daily Note     Today's date: 2018  Patient name: Janna Harding  : 1960  MRN: [id number]  Referring provider: Dian South MD  Dx:   Encounter Diagnosis     ICD-10-CM    1  Chronic neck pain M54 2     G89 29         1:1 with PTA EW for duration of treatment session          Subjective: Pt  Rates cervical pain 2/10 at start of treatment session  Objective: See treatment diary below  Precautions: N/A      Specialty Daily Treatment Diary      Manual       SOR  GRC  RM  CR       Towel Cervical Distraction GRC  RM  --       Cervical MET GRC    GR        manual isometrics      GRC        OA mob       [facility]                                   Exercise Diary       Chin Tucks on FR 15 x 5"  15 x 5"  5"x15 NP     Chin Tucks on FR with Rot 15 x 5"  15 x 5"  5"x15 NP     3 Finger Rot 15 x 5" ea  20x 5"  5"x15 ea   5" x 15  Seated on pball     MWM Ext 10 x 3"   10 x 5" ea  5"x10 NP     MWM Rot 10 x 3" ea  10 x 5" ea  5"x10 ea  NP     FR Protocol  7 x 1'   7x1'  7x1' ea  NP      TB Rows      GTB  5"x15 GTB  5" x 15  W/ deep neck flexion      TB low rows      OTB  5"x15 GTB  5" x 15  W/ deep neck flexion      TB W's      OTB  5"x15 NP      deep neck flexor iso        5" x 15      deep neck flexor with UT elevation        5" x 15      Deep neck flexion seated on Pball        5" x 15      TB ER        bilateral  OTB  5" x 15  w/                                                                                                             Modalities       10'   10'  10 mins  10'                                         Assessment: Tolerated treatment well  PT added several deep neck flexor TE today  Pt  Reports feeling a little sore but slightly looser post treatment session  Patient would benefit from continued PT      Plan: Continue per plan of care

## 2018-07-12 ENCOUNTER — OFFICE VISIT (OUTPATIENT)
Dept: PHYSICAL THERAPY | Facility: OTHER | Age: 58
End: 2018-07-12
Payer: COMMERCIAL

## 2018-07-12 DIAGNOSIS — M54.2 CHRONIC NECK PAIN: Primary | ICD-10-CM

## 2018-07-12 DIAGNOSIS — G89.29 CHRONIC NECK PAIN: Primary | ICD-10-CM

## 2018-07-12 PROCEDURE — 97112 NEUROMUSCULAR REEDUCATION: CPT | Performed by: PEDIATRICS

## 2018-07-12 PROCEDURE — 97110 THERAPEUTIC EXERCISES: CPT | Performed by: PEDIATRICS

## 2018-07-12 PROCEDURE — 97140 MANUAL THERAPY 1/> REGIONS: CPT | Performed by: PEDIATRICS

## 2018-07-12 NOTE — PROGRESS NOTES
Daily Note     Today's date: 2018  Patient name: Rui Reeves  : 1960  MRN: [id number]  Referring provider: Nacho Mathews MD  Dx:   Encounter Diagnosis     ICD-10-CM    1  Chronic neck pain M54 2     G89 29            1:1 for duration of treatment session       Subjective: Pt  States she is not having the pain wake her up at night anymore but notices her pain increases at the end of each day  Objective: See treatment diary below  Precautions: N/A      Specialty Daily Treatment Diary      Manual     SOR  GRC  RM  CR       Towel Cervical Distraction GRC  RM  --       Cervical MET GRC    GRC        manual isometrics      GRC        OA mob       [facility]                                 Exercise Diary     Chin Tucks on FR 15 x 5"  15 x 5"  5"x15 NP  NP   Chin Tucks on FR with Rot 15 x 5"  15 x 5"  5"x15 NP  NP   3 Finger Rot 15 x 5" ea  20x 5"  5"x15 ea   5" x 15  Seated on pball  5" x 15 seated on pball   MWM Ext 10 x 3"   10 x 5" ea  5"x10 NP     MWM Rot 10 x 3" ea  10 x 5" ea  5"x10 ea  NP     FR Protocol  7 x 1'   7x1'  7x1' ea  NP      TB Rows      GTB  5"x15 GTB  5" x 15  W/ deep neck flexion  GTB  5" x15  W/ deep neck flexion    TB low rows      OTB  5"x15 GTB  5" x 15  W/ deep neck flexion  GTB  5" x 15  W/ deep neck flexion    TB W's      OTB  5"x15 NP      deep neck flexor iso        5" x 15  5" x 15    deep neck flexor with UT elevation        5" x 15  5" x 15    Deep neck flexion seated on Pball        5" x 15  5" x 15    TB ER        bilateral  OTB  5" x 15  W/ chin tuck   bilateral  OTB  5" x 15  W/ chin tuck    scap punches w/ chin tuck          5#  2 x 10                                                                                             Modalities    MH 10'   10'  10 mins  10'  10'                                     Assessment: Tolerated treatment well   No complaints of increased pain during or post treatment session  Per PT, continue to focus on strengthening deep neck flexors  Patient would benefit from continued PT      Plan: Continue per plan of care

## 2018-07-16 ENCOUNTER — APPOINTMENT (OUTPATIENT)
Dept: PHYSICAL THERAPY | Facility: OTHER | Age: 58
End: 2018-07-16
Payer: COMMERCIAL

## 2018-07-17 ENCOUNTER — OFFICE VISIT (OUTPATIENT)
Dept: PHYSICAL THERAPY | Facility: OTHER | Age: 58
End: 2018-07-17
Payer: COMMERCIAL

## 2018-07-17 DIAGNOSIS — G89.29 CHRONIC NECK PAIN: Primary | ICD-10-CM

## 2018-07-17 DIAGNOSIS — M54.2 CHRONIC NECK PAIN: Primary | ICD-10-CM

## 2018-07-17 PROCEDURE — 97112 NEUROMUSCULAR REEDUCATION: CPT | Performed by: PEDIATRICS

## 2018-07-17 PROCEDURE — 97140 MANUAL THERAPY 1/> REGIONS: CPT | Performed by: PEDIATRICS

## 2018-07-17 PROCEDURE — 97110 THERAPEUTIC EXERCISES: CPT | Performed by: PEDIATRICS

## 2018-07-17 NOTE — PROGRESS NOTES
Daily Note     Today's date: 2018  Patient name: Virgil Butts  : 1960  MRN: [id number]  Referring provider: Radha Luu MD  Dx:   Encounter Diagnosis     ICD-10-CM    1  Chronic neck pain M54 2     G89 29                   Subjective: Pt  States she felt much better over the weekend but noticed some increased soreness this morning  She believes she may have slept on her neck wrong  Objective: See treatment diary below  Precautions: N/A      Specialty Daily Treatment Diary      Manual      SOR   RM  CR     GRC   Towel Cervical Distraction  RM  --     GRC   Cervical MET    GRC         manual isometrics    GRC         OA mob [facility]                                 Exercise Diary      Chin Tucks on FR  15 x 5"  5"x15 NP  NP    Chin Tucks on FR with Rot  15 x 5"  5"x15 NP  NP    3 Finger Rot  20x 5"  5"x15 ea   5" x 15  Seated on pball  5" x 15 seated on pball  5" x 15 seated on pball   MWM Ext  10 x 5" ea  5"x10 NP      MWM Rot  10 x 5" ea  5"x10 ea  NP      FR Protocol   7x1'  7x1' ea   NP       TB Rows    GTB  5"x15 GTB  5" x 15  W/ deep neck flexion  GTB  5" x15  W/ deep neck flexion GTB  5" x 15  Watch forward neck    TB low rows    OTB  5"x15 GTB  5" x 15  W/ deep neck flexion  GTB  5" x 15  W/ deep neck flexion GTB  5" x 15  Watch forward neck    TB W's    OTB  5"x15 NP       deep neck flexor iso      5" x 15  5" x 15 5" x 15    deep neck flexor with UT elevation      5" x 15  5" x 15 Wall slide    Deep neck flexion seated on Pball      5" x 15  5" x 15 5" x 15    TB ER      bilateral  OTB  5" x 15  W/ chin tuck   bilateral  OTB  5" x 15  W/ chin tuck Bilateral  GTB  5" x 15  W/ chin tuck    scap punches w/ chin tuck        5#  2 x 10 5#  2 x 10    Butterflies         5" x 15   Thoracic extension         5" x 15                                                             Modalities     10'   10'  10 mins  10'  10'                                         Assessment: Tolerated treatment well  Will continue to benefit from deep neck flexor strengthening  Patient would benefit from continued PT      Plan: Continue per plan of care

## 2018-07-19 ENCOUNTER — OFFICE VISIT (OUTPATIENT)
Dept: PHYSICAL THERAPY | Facility: OTHER | Age: 58
End: 2018-07-19
Payer: COMMERCIAL

## 2018-07-19 DIAGNOSIS — G89.29 CHRONIC NECK PAIN: Primary | ICD-10-CM

## 2018-07-19 DIAGNOSIS — M54.2 CHRONIC NECK PAIN: Primary | ICD-10-CM

## 2018-07-19 PROCEDURE — G8981 BODY POS CURRENT STATUS: HCPCS | Performed by: PHYSICAL THERAPIST

## 2018-07-19 PROCEDURE — G8982 BODY POS GOAL STATUS: HCPCS | Performed by: PHYSICAL THERAPIST

## 2018-07-19 PROCEDURE — 97110 THERAPEUTIC EXERCISES: CPT | Performed by: PEDIATRICS

## 2018-07-19 PROCEDURE — 97140 MANUAL THERAPY 1/> REGIONS: CPT | Performed by: PEDIATRICS

## 2018-07-19 NOTE — PROGRESS NOTES
Daily Note     Today's date: 2018  Patient name: Angel Christianson  : 1960  MRN: [id number]  Referring provider: eHsham Madrigal MD  Dx:   Encounter Diagnosis     ICD-10-CM    1  Chronic neck pain M54 2     G89 29         5463 - 1971 IEP  [id number] 1:1 with PTA EW          Subjective: Pt  Reports increased neck pain today  Pt  Reports she thinks she slept on her neck wrong  Pt  Reports she got new pillows and is trying to adjust to them  Objective: See treatment diary below  Precautions: N/A      Specialty Daily Treatment Diary      Manual      SOR   CR     [facility] EW   Towel Cervical Distraction  --     [facility] GRC   Cervical MET [address]          manual isometrics [address]         Vicky [facility]ita mob [facility]                                 Exercise Diary      Chin Tucks on FR  5"x15 NP  NP     Chin Tucks on FR with Rot  5"x15 NP  NP     3 Finger Rot  5"x15 ea   5" x 15  Seated on pball  5" x 15 seated on pball  5" x 15 seated on pball 5" x 15  Seated on pball   MWM Ext  5"x10 NP       MWM Rot  5"x10 ea  NP       FR Protocol   7x1' ea   NP        TB Rows  GTB  5"x15 GTB  5" x 15  W/ deep neck flexion  GTB  5" x15  W/ deep neck flexion GTB  5" x 15  Watch forward neck GTB  5" x 15      TB low rows  OTB  5"x15 GTB  5" x 15  W/ deep neck flexion  GTB  5" x 15  W/ deep neck flexion GTB  5" x 15  Watch forward neck GTB  5" x 15    TB W's  OTB  5"x15 NP        deep neck flexor iso    5" x 15  5" x 15 5" x 15 5" x 15    deep neck flexor with UT elevation    5" x 15  5" x 15 Wall slide Wall slide  5" x 15    Deep neck flexion seated on Pball    5" x 15  5" x 15 5" x 15 5" x 15    TB ER    bilateral  OTB  5" x 15  W/ chin tuck   bilateral  OTB  5" x 15  W/ chin tuck Bilateral  GTB  5" x 15  W/ chin tuck Bilateral  GTB  5" x 15  W/ chin tuck    scap punches w/ chin tuck      5#  2 x 10 5#  2 x 10 5#  2 x 10    Butterflies       5" x 15 5" x 15   Thoracic extension       5" x 15 5" x 15                                                         Modalities 6/28 7/2 7/5 7/9 7/12    10'   10'  10 mins  10'  10'                                       Assessment: Tolerated treatment well  Patient would benefit from continued PT      Plan: Continue per plan of care

## 2018-07-23 ENCOUNTER — OFFICE VISIT (OUTPATIENT)
Dept: PHYSICAL THERAPY | Facility: OTHER | Age: 58
End: 2018-07-23
Payer: COMMERCIAL

## 2018-07-23 DIAGNOSIS — G89.29 CHRONIC NECK PAIN: Primary | ICD-10-CM

## 2018-07-23 DIAGNOSIS — M54.2 CHRONIC NECK PAIN: Primary | ICD-10-CM

## 2018-07-23 PROCEDURE — 97110 THERAPEUTIC EXERCISES: CPT | Performed by: PEDIATRICS

## 2018-07-23 PROCEDURE — 97140 MANUAL THERAPY 1/> REGIONS: CPT | Performed by: PEDIATRICS

## 2018-07-23 NOTE — PROGRESS NOTES
Daily Note     Today's date: 2018  Patient name: Dara Broussard  : 1960  MRN: [id number]  Referring provider: Susu Ibarra MD  Dx:   Encounter Diagnosis     ICD-10-CM    1  Chronic neck pain M54 2     G89 29         8:00-8:30 1:1 with PTA EW  8:30-8:50 IEP          Subjective: Pt  States she had some increased discomfort yesterday but is feeling better today  Pt  Notices her ROM has improved  She states it is easier to look over her shoulder while driving        Objective: See treatment diary below  Precautions: N/A      Specialty Daily Treatment Diary      Manual      SOR      [facility] EW [facility]   Towel Cervical Distraction     [facility]   Cervical MET           manual isometrics           OA mob [address]   Cervical ROM        First rib mob                      Exercise Diary      Chin Tucks on FR NP  NP      Chin Tucks on FR with Rot NP  NP      3 Finger Rot  5" x 15  Seated on pball  5" x 15 seated on pball  5" x 15 seated on pball 5" x 15  Seated on pball 5" x 15  Seated on pball   MWM Ext NP        MWM Rot NP        FR Protocol  NP         TB Rows GTB  5" x 15  W/ deep neck flexion  GTB  5" x15  W/ deep neck flexion GTB  5" x 15  Watch forward neck GTB  5" x 15   GTB  5"x 15    TB low rows GTB  5" x 15  W/ deep neck flexion  GTB  5" x 15  W/ deep neck flexion GTB  5" x 15  Watch forward neck GTB  5" x 15 GTB  5" x 15    TB W's NP         deep neck flexor iso  5" x 15  5" x 15 5" x 15 5" x 15 5" x 15    deep neck flexor with UT elevation  5" x 15  5" x 15 Wall slide Wall slide  5" x 15 Wall slide  5" x 15    Deep neck flexion seated on Pball  5" x 15  5" x 15 5" x 15 5" x 15 5" x 15    TB ER  bilateral  OTB  5" x 15  W/ chin tuck   bilateral  OTB  5" x 15  W/ chin tuck Bilateral  GTB  5" x 15  W/ chin tuck Bilateral  GTB  5" x 15  W/ chin tuck Bilateral  GTB  5" x 15  W/ chin tuck    scap punches w/ chin tuck    5#  2 x 10 5#  2 x 10 5#  2 x 10 5#  2 x 15    Butterflies     5" x 15 5" x 15 5" x 15   Thoracic extension     5" x 15 5" x 15 5" x 15                                                     Modalities 6/28 7/2 7/5 7/9 7/12    10'   10'  10 mins  10'  10'                                         Assessment: Tolerated treatment well  No increased symptoms noted throughout treatment session  Pt  Notes relief with manual interventions  Patient would benefit from continued PT      Plan: Continue per plan of care

## 2018-07-26 ENCOUNTER — OFFICE VISIT (OUTPATIENT)
Dept: PHYSICAL THERAPY | Facility: OTHER | Age: 58
End: 2018-07-26
Payer: COMMERCIAL

## 2018-07-26 DIAGNOSIS — G89.29 CHRONIC NECK PAIN: Primary | ICD-10-CM

## 2018-07-26 DIAGNOSIS — M54.2 CHRONIC NECK PAIN: Primary | ICD-10-CM

## 2018-07-26 PROCEDURE — 97110 THERAPEUTIC EXERCISES: CPT | Performed by: PHYSICAL THERAPIST

## 2018-07-26 PROCEDURE — 97140 MANUAL THERAPY 1/> REGIONS: CPT | Performed by: PHYSICAL THERAPIST

## 2018-07-26 PROCEDURE — 97112 NEUROMUSCULAR REEDUCATION: CPT | Performed by: PHYSICAL THERAPIST

## 2018-07-30 ENCOUNTER — OFFICE VISIT (OUTPATIENT)
Dept: PHYSICAL THERAPY | Facility: OTHER | Age: 58
End: 2018-07-30
Payer: COMMERCIAL

## 2018-07-30 DIAGNOSIS — M54.2 CHRONIC NECK PAIN: Primary | ICD-10-CM

## 2018-07-30 DIAGNOSIS — G89.29 CHRONIC NECK PAIN: Primary | ICD-10-CM

## 2018-07-30 PROCEDURE — 97140 MANUAL THERAPY 1/> REGIONS: CPT | Performed by: PHYSICAL MEDICINE & REHABILITATION

## 2018-07-30 PROCEDURE — 97112 NEUROMUSCULAR REEDUCATION: CPT | Performed by: PHYSICAL MEDICINE & REHABILITATION

## 2018-07-30 PROCEDURE — 97110 THERAPEUTIC EXERCISES: CPT | Performed by: PHYSICAL MEDICINE & REHABILITATION

## 2018-07-30 NOTE — PROGRESS NOTES
Daily Note     Today's date: 2018  Patient name: Narciso Knowles  : 1960  MRN: [id number]  Referring provider: Amparo Ovalle MD  Dx:   Encounter Diagnosis     ICD-10-CM    1  Chronic neck pain M54 2     G89 29      1 on 1 with PT for entire session             Subjective: Patient reports no change in symptoms since last visit, notes 5/10 pain with cervical AROM   "If I don't move my head I have no pain "      Objective: See treatment diary below  Precautions: N/A      Specialty Daily Treatment Diary      Manual     SOR  [facility] KK   Towel Cervical Distraction [facility]   Cervical MET           manual isometrics           OA mob [facility] KK   Cervical ROM         First rib mob                        Exercise Diary     Chin Tucks on FR   NP       Chin Tucks on FR with Rot   NP       3 Finger Rot 15x5" on pball  5" x 15 seated on pball  5" x 15 seated on pball 5" x 15  Seated on pball 5" x 15  Seated on pball 5" x 15   MWM Ext          MWM Rot          FR Protocol            TB Rows GTB 15x5"  GTB  5" x15  W/ deep neck flexion GTB  5" x 15  Watch forward neck GTB  5" x 15   GTB  5"x 15 GTB 5" x 15    TB low rows GTB 15x5"  GTB  5" x 15  W/ deep neck flexion GTB  5" x 15  Watch forward neck GTB  5" x 15 GTB  5" x 15 GTB 5"x 15     TB W's           deep neck flexor iso 15x5"  5" x 15 5" x 15 5" x 15 5" x 15 5" x 15    deep neck flexor with UT elevation Wall slide 15x5"  5" x 15 Wall slide Wall slide  5" x 15 Wall slide  5" x 15 Wall slide    5" x 15    Deep neck flexion seated on Pball 15x5"  5" x 15 5" x 15 5" x 15 5" x 15 5" x 15    TB ER Bilateral GTB 5" x 15 w/chin tuck   bilateral  OTB  5" x 15  W/ chin tuck Bilateral  GTB  5" x 15  W/ chin tuck Bilateral  GTB  5" x 15  W/ chin tuck Bilateral  GTB  5" x 15  W/ chin tuck Bilateral GTB 5" x 15 w/chin tuck    scap punches w/ chin tuck  5# 2x15  5#  2 x 10 5#  2 x 10 5#  2 x 10 5#  2 x 15 5# 2x15    Butterflies  15x5"   5" x 15 5" x 15 5" x 15 5" x 15   Thoracic extension  15x5"   5" x 15 5" x 15 5" x 15 5" x 15    Towel SNAGs for bilat  Rot         10 ea                                              Modalities 7/30 7/2 7/5 7/9 7/12 7/26   MH 10'  10'  10 mins  10'  10' 10'                                           Assessment: Patient demonstrates fair tolerance to intervention as charted, continues with increased tone through posterior cervical musculature  Plan: Continue per plan of care

## 2018-08-02 ENCOUNTER — APPOINTMENT (OUTPATIENT)
Dept: PHYSICAL THERAPY | Facility: OTHER | Age: 58
End: 2018-08-02
Payer: COMMERCIAL

## 2018-08-06 ENCOUNTER — OFFICE VISIT (OUTPATIENT)
Dept: PHYSICAL THERAPY | Facility: OTHER | Age: 58
End: 2018-08-06
Payer: COMMERCIAL

## 2018-08-06 DIAGNOSIS — M54.2 CHRONIC NECK PAIN: Primary | ICD-10-CM

## 2018-08-06 DIAGNOSIS — G89.29 CHRONIC NECK PAIN: Primary | ICD-10-CM

## 2018-08-06 PROCEDURE — 97140 MANUAL THERAPY 1/> REGIONS: CPT | Performed by: PEDIATRICS

## 2018-08-06 PROCEDURE — 97112 NEUROMUSCULAR REEDUCATION: CPT | Performed by: PEDIATRICS

## 2018-08-06 PROCEDURE — 97110 THERAPEUTIC EXERCISES: CPT | Performed by: PEDIATRICS

## 2018-08-06 PROCEDURE — 97530 THERAPEUTIC ACTIVITIES: CPT | Performed by: PEDIATRICS

## 2018-08-06 NOTE — PROGRESS NOTES
Daily Note     Today's date: 2018  Patient name: Janna Harding  : 1960  MRN: [id number]  Referring provider: Dian South MD  Dx:   Encounter Diagnosis     ICD-10-CM    1  Chronic neck pain M54 2     G89 29      MHP [id number]   1:1 with PTA [id number] and PTA EW [id number]          Subjective: Pt  Reports she continues to have the same neck pain  Reports pain is worse at night time and first thing in the morning  Still notices difficulty with turning her head  over both shoulders        Objective: See treatment diary below  Precautions: N/A      Specialty Daily Treatment Diary      Manual     SOR  [address] KK   Towel Cervical Distraction [address] KK   Cervical MET           manual isometrics           OA mob [address] KK   Cervical ROM         First rib mob                        Exercise Diary     Chin Tucks on FR   NP       Chin Tucks on FR with Rot   NP       3 Finger Rot 15x5" on pball  5" x 15 seated on pball  5" x 15 seated on pball 5" x 15  Seated on pball 5" x 15  Seated on pball 5" x 15   MWM Ext          MWM Rot          FR Protocol            TB Rows GTB 15x5"  GTB  5" x15   GTB  5" x 15  Watch forward neck GTB  5" x 15   GTB  5"x 15 GTB 5" x 15    TB low rows GTB 15x5"  GTB  5" x 15   GTB  5" x 15  Watch forward neck GTB  5" x 15 GTB  5" x 15 GTB 5"x 15     TB W's           deep neck flexor iso 15x5"  5" x 15 5" x 15 5" x 15 5" x 15 5" x 15    deep neck flexor with UT elevation Wall slide 15x5" Wall slide   5" x 15 Wall slide Wall slide  5" x 15 Wall slide  5" x 15 Wall slide    5" x 15    Deep neck flexion seated on Pball 15x5"  5" x 15 5" x 15 5" x 15 5" x 15 5" x 15    TB ER Bilateral GTB 5" x 15 w/chin tuck   bilateral  GTB  5" x 15  W/ chin tuck Bilateral  GTB  5" x 15  W/ chin tuck Bilateral  GTB  5" x 15  W/ chin tuck Bilateral  GTB  5" x 15  W/ chin tuck Bilateral GTB 5" x 15 w/chin tuck    scap punches w/ chin tuck  5# 2x15  5#  2 x 10 5#  2 x 10 5#  2 x 10 5#  2 x 15 5# 2x15    Butterflies  15x5"  15 x 5" 5" x 15 5" x 15 5" x 15 5" x 15   Thoracic extension  15x5"  15 x 5" 5" x 15 5" x 15 5" x 15 5" x 15    Towel SNAGs for bilat  Rot         10 ea                                              Modalities 7/30 8/6 7/5 7/9 7/12 7/26   MH 10'  10'  10 mins  10'  10' 10'                                         Assessment: Tolerated treatment well  Demonstrates good knowledge of current exercise program  Relief noted with manual interventions  Patient would benefit from continued PT      Plan: Continue per plan of care

## 2018-08-08 ENCOUNTER — OFFICE VISIT (OUTPATIENT)
Dept: PHYSICAL THERAPY | Facility: OTHER | Age: 58
End: 2018-08-08
Payer: COMMERCIAL

## 2018-08-08 DIAGNOSIS — G89.29 CHRONIC NECK PAIN: Primary | ICD-10-CM

## 2018-08-08 DIAGNOSIS — M54.2 CHRONIC NECK PAIN: Primary | ICD-10-CM

## 2018-08-08 PROCEDURE — 97140 MANUAL THERAPY 1/> REGIONS: CPT

## 2018-08-08 NOTE — PROGRESS NOTES
Daily Note     Today's date: 2018  Patient name: Dara Broussard  : 1960  MRN: [id number]  Referring provider: Susu Ibarra MD  Dx:   Encounter Diagnosis     ICD-10-CM    1  Chronic neck pain M54 2     G89 29      MHP 02:00-2:10  2:10-2:30 1 on 1   2:30-2:45 unbilled          Subjective: Patient reports L sided suboccipital discomfort today; she did not work today         Objective: See treatment diary below  Precautions: N/A      Specialty Daily Treatment Diary      Manual     SOR  [address] [facility] KK MP   Towel Cervical Distraction LH [address] np   Cervical MET        np    manual isometrics        np    OA mob [address] KK np   Cervical ROM       Lateral side bend MP   First rib mob       np   TPR       MP         Exercise Diary      Chin Tucks on FR   NP        Chin Tucks on FR with Rot   NP        3 Finger Rot 15x5" on pball  5" x 15 seated on pball  5" x 15 seated on pball 5" x 15  Seated on pball 5" x 15  Seated on pball 5" x 15    MWM Ext           MWM Rot           FR Protocol             TB Rows GTB 15x5"  GTB  5" x15   GTB  5" x 15  Watch forward neck GTB  5" x 15   GTB  5"x 15 GTB 5" x 15     TB low rows GTB 15x5"  GTB  5" x 15   GTB  5" x 15  Watch forward neck GTB  5" x 15 GTB  5" x 15 GTB 5"x 15      TB W's            deep neck flexor iso 15x5"  5" x 15 5" x 15 5" x 15 5" x 15 5" x 15     deep neck flexor with UT elevation Wall slide 15x5" Wall slide   5" x 15 Wall slide Wall slide  5" x 15 Wall slide  5" x 15 Wall slide    5" x 15     Deep neck flexion seated on Pball 15x5"  5" x 15 5" x 15 5" x 15 5" x 15 5" x 15     TB ER Bilateral GTB 5" x 15 w/chin tuck   bilateral  GTB  5" x 15  W/ chin tuck Bilateral  GTB  5" x 15  W/ chin tuck Bilateral  GTB  5" x 15  W/ chin tuck Bilateral  GTB  5" x 15  W/ chin tuck Bilateral GTB 5" x 15 w/chin tuck     scap punches w/ chin tuck  5# 2x15  5#  2 x 10 5#  2 x 10 5#  2 x 10 5#  2 x 15 5# 2x15     Butterflies  15x5"  15 x 5" 5" x 15 5" x 15 5" x 15 5" x 15    Thoracic extension  15x5"  15 x 5" 5" x 15 5" x 15 5" x 15 5" x 15     Towel SNAGs for bilat  Rot         10 ea                                                  Modalities 7/30  8/6  7/5 7/9 7/12 7/26 8/8   MH 10'  10'  10 mins  10'  10' 10' To begin 10'    KT UT inhibition            MP                           Assessment: Tolerated treatment well  Performed TPR as well as manual UT stretching; assess response NV  KT applied to bilateral UT for inhibition  Demonstrates good knowledge of current exercise program   Patient would benefit from continued PT      Plan: Continue per plan of care

## 2018-08-09 ENCOUNTER — APPOINTMENT (OUTPATIENT)
Dept: PHYSICAL THERAPY | Facility: OTHER | Age: 58
End: 2018-08-09
Payer: COMMERCIAL

## 2018-08-13 ENCOUNTER — OFFICE VISIT (OUTPATIENT)
Dept: PHYSICAL THERAPY | Facility: OTHER | Age: 58
End: 2018-08-13
Payer: COMMERCIAL

## 2018-08-13 DIAGNOSIS — G89.29 CHRONIC NECK PAIN: Primary | ICD-10-CM

## 2018-08-13 DIAGNOSIS — M54.2 CHRONIC NECK PAIN: Primary | ICD-10-CM

## 2018-08-13 PROCEDURE — 97140 MANUAL THERAPY 1/> REGIONS: CPT | Performed by: PEDIATRICS

## 2018-08-13 PROCEDURE — 97112 NEUROMUSCULAR REEDUCATION: CPT | Performed by: PEDIATRICS

## 2018-08-13 NOTE — PROGRESS NOTES
Daily Note     Today's date: 2018  Patient name: Nish Mcallister  : 1960  MRN: [id number]  Referring provider: Tristan Melendez MD  Dx:   Encounter Diagnosis     ICD-10-CM    1  Chronic neck pain M54 2     G89 29         [id number] 1:1 with   [id number] IEP       Subjective: Pt  Reports she has not noticed much improvement with PT  She states she feels better when she leaves PT but pain always returns at night  Objective: See treatment diary below  Precautions: N/A      Specialty Daily Treatment Diary      Manual     SOR  LH [address] [facility] [address] MP    Towel Cervical Distraction LH [address] np    Cervical MET    np     manual isometrics    np     OA mob LH [address] np    Cervical ROM   Lateral side bend MP    First rib mob   np    TPR   MP          Exercise Diary     Chin Tucks on FR   NP     Chin Tucks on FR with Rot   NP     3 Finger Rot 15x5" on pball  5" x 15 seated on pball 5" x 15 5" x 15   MWM Ext        MWM Rot        FR Protocol          TB Rows GTB 15x5"  GTB  5" x15   GTB 5"x 15  GTB  5:x 15    TB low rows GTB 15x5"  GTB  5" x 15   GTB 5"x 15  GTB  5" x 15    TB W's         deep neck flexor iso 15x5"  5" x 15 5" x 15 5": x 15    deep neck flexor with UT elevation Wall slide 15x5" Wall slide   5" x 15  Wall slide  5" x 15    Deep neck flexion seated on Pball 15x5"  5" x 15 5" x 15 5" x 15    TB ER Bilateral GTB 5" x 15 w/chin tuck   bilateral  GTB  5" x 15  W/ chin tuck Bilateral GTB 5" x 15 w/chin tuck Bilateral  gtb 5"x15    scap punches w/ chin tuck  5# 2x15  5#  2 x 10 5# 2x15 5# 2 x 15    Butterflies  15x5"  15 x 5" 5" x 15 5" x 15   Thoracic extension  15x5"  15 x 5" 5" x 15 5" 15    Towel SNAGs for bilat  Rot      10ea 10ea                                       Modalities     10'  10' To begin 10' 10'    KT UT inhibition     MP                    Assessment: Tolerated treatment well  Demonstrates good knowledge of current exercises   Demonstrates good technique throughout treatment  Mild relief post treatment session is noted  Patient would benefit from continued PT      Plan: Continue per plan of care

## 2018-08-15 DIAGNOSIS — F41.9 ANXIETY: ICD-10-CM

## 2018-08-15 RX ORDER — SERTRALINE HYDROCHLORIDE 25 MG/1
TABLET, FILM COATED ORAL
Qty: 90 TABLET | Refills: 1 | Status: SHIPPED | OUTPATIENT
Start: 2018-08-15 | End: 2018-08-27 | Stop reason: SDUPTHER

## 2018-08-16 ENCOUNTER — OFFICE VISIT (OUTPATIENT)
Dept: PHYSICAL THERAPY | Facility: OTHER | Age: 58
End: 2018-08-16
Payer: COMMERCIAL

## 2018-08-16 DIAGNOSIS — M54.2 CHRONIC NECK PAIN: Primary | ICD-10-CM

## 2018-08-16 DIAGNOSIS — G89.29 CHRONIC NECK PAIN: Primary | ICD-10-CM

## 2018-08-16 PROCEDURE — G8979 MOBILITY GOAL STATUS: HCPCS

## 2018-08-16 PROCEDURE — 97112 NEUROMUSCULAR REEDUCATION: CPT | Performed by: PEDIATRICS

## 2018-08-16 PROCEDURE — G8978 MOBILITY CURRENT STATUS: HCPCS

## 2018-08-16 PROCEDURE — 97140 MANUAL THERAPY 1/> REGIONS: CPT | Performed by: PEDIATRICS

## 2018-08-16 NOTE — PROGRESS NOTES
Daily Note     Today's date: 2018  Patient name: Angi Asif  : 1960  MRN: [id number]  Referring provider: Jan Salas MD  Dx:   Encounter Diagnosis     ICD-10-CM    1  Chronic neck pain M54 2     G89 29           10:05 -10:30 1:1 CF, 10:30 -10:42 IEP     Subjective:  Pt state she has 1/10 pain along the left side of her neck which gets worse when she looks to the left  Objective: See treatment diary below  Precautions: N/A      Specialty Daily Treatment Diary      Manual     SOR  LH [address] MP     Towel Cervical Distraction LH  GRC np     Cervical MET    np      manual isometrics    np      OA mob LH  GRC np     Cervical ROM   Lateral side bend MP  CF   First rib mob   np     TPR   MP           Exercise Diary     Chin Tucks on FR   NP   NP   Chin Tucks on FR with Rot   NP   NP   3 Finger Rot 15x5" on pball  5" x 15 seated on pball 5" x 15 5" x 15 5 " x15   MWM Ext         MWM Rot         FR Protocol           TB Rows GTB 15x5"  GTB  5" x15   GTB 5"x 15  GTB  5:x 15 GTB 5" x15    TB low rows GTB 15x5"  GTB  5" x 15   GTB 5"x 15  GTB  5" x 15 GTB 5" x 15     TB W's          deep neck flexor iso 15x5"  5" x 15 5" x 15 5": x 15 5" 15    deep neck flexor with UT elevation Wall slide 15x5" Wall slide   5" x 15  Wall slide  5" x 15 Wall slides   5"x15    Deep neck flexion seated on Pball 15x5"  5" x 15 5" x 15 5" x 15  5'x15    TB ER Bilateral GTB 5" x 15 w/chin tuck   bilateral  GTB  5" x 15  W/ chin tuck Bilateral GTB 5" x 15 w/chin tuck Bilateral  gtb 5"x15 Bilateral   GTB 5"x15    scap punches w/ chin tuck  5# 2x15  5#  2 x 10 5# 2x15 5# 2 x 15  5# 2x15    Butterflies  15x5"  15 x 5" 5" x 15 5" x 15 5" x15   Thoracic extension  15x5"  15 x 5" 5" x 15 5" 15 5"x15    Towel SNAGs for bilat   Rot      10ea 10ea 10 each                                           Modalities    MH 10'  10' To begin 10' 10'  10'    KT UT inhibition     MP  NP                  Assessment: Pt demonstrates good tolerance to therapeutic exercise as she had a decrease in "tightness and pain" at end of session  Pt was able to gain active ROM post manual as she looked to her left  Pt would contine to benefit from skilled services to focus on active ROM of her c-spine  Plan: Continue per plan of care

## 2018-08-20 ENCOUNTER — OFFICE VISIT (OUTPATIENT)
Dept: PHYSICAL THERAPY | Facility: OTHER | Age: 58
End: 2018-08-20
Payer: COMMERCIAL

## 2018-08-20 DIAGNOSIS — G89.29 CHRONIC NECK PAIN: Primary | ICD-10-CM

## 2018-08-20 DIAGNOSIS — M54.2 CHRONIC NECK PAIN: Primary | ICD-10-CM

## 2018-08-20 PROCEDURE — 97110 THERAPEUTIC EXERCISES: CPT | Performed by: PEDIATRICS

## 2018-08-20 PROCEDURE — 97140 MANUAL THERAPY 1/> REGIONS: CPT | Performed by: PEDIATRICS

## 2018-08-20 PROCEDURE — G8985 CARRY GOAL STATUS: HCPCS | Performed by: PEDIATRICS

## 2018-08-20 PROCEDURE — G8984 CARRY CURRENT STATUS: HCPCS | Performed by: PEDIATRICS

## 2018-08-20 PROCEDURE — 97112 NEUROMUSCULAR REEDUCATION: CPT | Performed by: PEDIATRICS

## 2018-08-20 NOTE — PROGRESS NOTES
Daily Note     Today's date: 2018  Patient name: Brendan Nielson  : 1960  MRN: [id number]  Referring provider: Ana Nixon MD  Dx:   Encounter Diagnosis     ICD-10-CM    1  Chronic neck pain M54 2     G89 29         [id number] [facility]  [id number] 1:1 with PT MJ  [id number] 1:1 with PTA EW          Subjective: Pt  Reports she feels like she is making very slow progress  She states her pain levels have decreased some since beginning PT  She also feels like her ROM has improved  Pt  States she is able to sleep at night as long as she does not sleep on her back         Objective: See treatment diary below  Precautions: N/A      Specialty Daily Treatment Diary      Manual      SOR  [address] MP      Towel Cervical Distraction [address] np      Cervical MET   np       manual isometrics   np       OA mob [address] np      Cervical ROM  Lateral side bend MP  CF EW   First rib mob  np   [facility]   TPR  MP            Exercise Diary      Chin Tucks on FR  NP   NP    Chin Tucks on FR with Rot  NP   NP    3 Finger Rot  5" x 15 seated on pball 5" x 15 5" x 15 5 " x15 5" x 15   MWM Ext         MWM Rot         FR Protocol           TB Rows  GTB  5" x15   GTB 5"x 15  GTB  5:x 15 GTB 5" x15 GTB  5" x 15    TB low rows  GTB  5" x 15   GTB 5"x 15  GTB  5" x 15 GTB 5" x 15  GTB  5" x 15    TB W's          deep neck flexor iso  5" x 15 5" x 15 5": x 15 5" 15 5" x 15    deep neck flexor with UT elevation Wall slide   5" x 15  Wall slide  5" x 15 Wall slides   5"x15 Wall slides  5" x 15    Deep neck flexion seated on Pball  5" x 15 5" x 15 5" x 15  5'x15     TB ER   bilateral  GTB  5" x 15  W/ chin tuck Bilateral GTB 5" x 15 w/chin tuck Bilateral  gtb 5"x15 Bilateral   GTB 5"x15 Bilateral  GTB  5" x 15    scap punches w/ chin tuck  5#  2 x 10 5# 2x15 5# 2 x 15  5# 2x15 5#  2x15    Butterflies  15 x 5" 5" x 15 5" x 15 5" x15 5"x 15   Thoracic extension  15 x 5" 5" x 15 5" 15 5"x15 5" x 15    Towel SNAGs for bilat  Rot    10ea 10ea 10 each  NP                                       Modalities 7/30 8/6 8/8 8/13 8/16    10'  10' To begin 10' 10'  10'    KT UT inhibition     MP  NP                      Assessment: Tolerated treatment well  Patient will be DC to HEP at this time  Pt  Demonstrates good understanding of HEP  Plan: Continue per plan of care

## 2018-08-23 ENCOUNTER — APPOINTMENT (OUTPATIENT)
Dept: PHYSICAL THERAPY | Facility: OTHER | Age: 58
End: 2018-08-23
Payer: COMMERCIAL

## 2018-08-27 ENCOUNTER — OFFICE VISIT (OUTPATIENT)
Dept: INTERNAL MEDICINE CLINIC | Facility: CLINIC | Age: 58
End: 2018-08-27
Payer: COMMERCIAL

## 2018-08-27 ENCOUNTER — HOSPITAL ENCOUNTER (OUTPATIENT)
Dept: RADIOLOGY | Facility: HOSPITAL | Age: 58
Discharge: HOME | End: 2018-08-27
Payer: COMMERCIAL

## 2018-08-27 ENCOUNTER — OFFICE VISIT (OUTPATIENT)
Dept: OBGYN CLINIC | Facility: HOSPITAL | Age: 58
End: 2018-08-27
Payer: COMMERCIAL

## 2018-08-27 VITALS
WEIGHT: 188.05 LBS | BODY MASS INDEX: 31.33 KG/M2 | DIASTOLIC BLOOD PRESSURE: 74 MMHG | SYSTOLIC BLOOD PRESSURE: 130 MMHG | HEART RATE: 81 BPM | HEIGHT: 65 IN

## 2018-08-27 VITALS
RESPIRATION RATE: 16 BRPM | SYSTOLIC BLOOD PRESSURE: 124 MMHG | WEIGHT: 188 LBS | BODY MASS INDEX: 31.32 KG/M2 | TEMPERATURE: 97.9 F | DIASTOLIC BLOOD PRESSURE: 80 MMHG | HEART RATE: 76 BPM | HEIGHT: 65 IN | OXYGEN SATURATION: 97 %

## 2018-08-27 DIAGNOSIS — E78.00 HYPERCHOLESTEROLEMIA: ICD-10-CM

## 2018-08-27 DIAGNOSIS — G43.009 MIGRAINE WITHOUT AURA AND WITHOUT STATUS MIGRAINOSUS, NOT INTRACTABLE: ICD-10-CM

## 2018-08-27 DIAGNOSIS — M54.2 NECK PAIN: ICD-10-CM

## 2018-08-27 DIAGNOSIS — R79.89 LOW VITAMIN D LEVEL: ICD-10-CM

## 2018-08-27 DIAGNOSIS — F41.9 ANXIETY: Primary | ICD-10-CM

## 2018-08-27 DIAGNOSIS — Z78.9 PARTICIPANT IN HEALTH AND WELLNESS PLAN: ICD-10-CM

## 2018-08-27 DIAGNOSIS — J30.2 SEASONAL ALLERGIC RHINITIS, UNSPECIFIED TRIGGER: ICD-10-CM

## 2018-08-27 DIAGNOSIS — M62.838 CERVICAL PARASPINAL MUSCLE SPASM: ICD-10-CM

## 2018-08-27 DIAGNOSIS — M54.2 NECK PAIN: Primary | ICD-10-CM

## 2018-08-27 PROBLEM — K63.5 POLYP OF COLON: Status: ACTIVE | Noted: 2018-05-01

## 2018-08-27 LAB — SL AMB POCT GLUCOSE BLD: 103

## 2018-08-27 PROCEDURE — 72040 X-RAY EXAM NECK SPINE 2-3 VW: CPT

## 2018-08-27 PROCEDURE — 99214 OFFICE O/P EST MOD 30 MIN: CPT | Performed by: INTERNAL MEDICINE

## 2018-08-27 PROCEDURE — 1036F TOBACCO NON-USER: CPT | Performed by: INTERNAL MEDICINE

## 2018-08-27 PROCEDURE — 82948 REAGENT STRIP/BLOOD GLUCOSE: CPT | Performed by: INTERNAL MEDICINE

## 2018-08-27 PROCEDURE — 99203 OFFICE O/P NEW LOW 30 MIN: CPT | Performed by: PHYSICIAN ASSISTANT

## 2018-08-27 NOTE — PATIENT INSTRUCTIONS
1  Blood work  2  Return in 6 months or sooner if questions  3   Consider shingrix vaccine at follow up visit

## 2018-08-27 NOTE — PROGRESS NOTES
Assessment/Plan   Diagnoses and all orders for this visit:    Chronic Neck pain  -     XR spine cervical 2 or 3 vw injury; Future      Cervical paraspinal muscle spasm  -     Ambulatory referral to Spine & Pain; Future          Subjective   Patient ID: Raciel Gutierrez is a 56 y o  female  Vitals:    08/27/18 1804   BP: 130/74   Pulse: 80     58yo female comes in for an evaluation of her neck  She has been having pain for about 9 months with no injury  She was a patient of Dr Rossi Moncada and has been doing physical therapy  The PT helped very mildly  A prednisone taper was also mildly helpful  The pain is in the b/l posterior neck and does not radiate  No numbness or weakness          The following portions of the patient's history were reviewed and updated as appropriate: allergies, current medications, past family history, past medical history, past social history, past surgical history and problem list     Review of Systems  Ortho Exam  Past Medical History:   Diagnosis Date    Daytime sleepiness     resolved 05/30/2017    History of oral aphthous ulcers     last assessed 04/01/2013    Migraine     last assessed 11/28/2016    Other specified menopausal and perimenopausal disorders (CODE)     last assessed 10/25/2013    Snoring     last assessed 08/29/2012    Wheezing     resolved 10/27/2014     Past Surgical History:   Procedure Laterality Date    COLONOSCOPY      jun 2009 normal dr Tia Jauregui    ESOPHAGOGASTRODUODENOSCOPY      gastritis dr Petr Galloway 11/10/09    NASAL SEPTUM SURGERY      last assessed 05/30/2017    SHOULDER ARTHROSCOPY Left 02/05/2011    subacronical decompression lysis of adhesant and labrial debridment dr Ghassan Martinez Bilateral     for bone spurs in past, last assessed 03/11/2016    TONSILLECTOMY AND ADENOIDECTOMY       Family History   Problem Relation Age of Onset    Colon cancer Mother         dx'd in 46s    Other Father         carotid artery stenosis per allscripts    Hypertension Father     Panic disorder Sister         without agoraphobia per allcripts    Alcohol abuse Neg Hx     Substance Abuse Neg Hx     Mental illness Neg Hx     Depression Neg Hx      Social History     Occupational History    bookeeper      Social History Main Topics    Smoking status: Never Smoker    Smokeless tobacco: Never Used      Comment: tried as a teenager no cigs since    Alcohol use Yes      Comment: social, rarely per allscripts    Drug use: No    Sexual activity: Not on file       Review of Systems   Constitutional: Negative  HENT: Negative  Eyes: Negative  Respiratory: Negative  Cardiovascular: Negative  Gastrointestinal: Negative  Endocrine: Negative  Genitourinary: Negative  Musculoskeletal: As below      Allergic/Immunologic: Negative  Neurological: Negative  Hematological: Negative  Psychiatric/Behavioral: Negative  Objective   Physical Exam      I have personally reviewed pertinent films in PACS and my interpretation is no fracture  Straight curve         · Constitutional: Awake, Alert, Oriented  · Eyes: EOMI  · Psych: Mood and affect appropriate  · Heart: regular rate and rhythm  · Lungs: No audible wheezing  · Abdomen: soft  · Lymph: no lymphedema     bilateral Neck / CSpine:  - Appearance   Inspection of neck is normal with normal lordosis and no scoliosis, erythema, ecchymosis, or rash  - Palpation   No tenderness of the midline bony landmarks,  Paraspinal musculature tenderness:  bilateral  - ROM   full active ROM    Pain with flexion and with rotation to the left  - Motor   Normal 5/5 strength in UE myotomes bilaterally, no muscle wasting or atrophy and no fasciculations noted  - Sensation   Sensation intact to light touch in UE dermatomes b/l  - DTRs   DTRs are 1+ and symmetric bilaterally at the bicep, tricep, and brachioradialis, Negative Karena's and No clonus  - Special Tests   Negative spurlings

## 2018-08-27 NOTE — PROGRESS NOTES
Assessment/Plan:     Diagnoses and all orders for this visit:    Anxiety  Comments:  doing better with sertraline 50mg once a day, c/w rx and f/u 6 mos or prn  Orders:  -     sertraline (ZOLOFT) 50 mg tablet; Take 1 tablet (50 mg total) by mouth daily at bedtime    Migraine without aura and without status migrainosus, not intractable  Comments:  takes maxalt prn & less often now but still with relief  advised to avoid taking flexeril on same day as taking maxalt  Orders:  -     Comprehensive metabolic panel; Future    Seasonal allergic rhinitis, unspecified trigger  Comments:  stable with singulair, sees allergy for ongoing care    Hypercholesterolemia  Comments:  elevated in past, re-check now with wellness BW    Low vitamin D level  Comments:  re-check Vit D BW  Orders:  -     Comprehensive metabolic panel; Future  -     Vitamin D 25 hydroxy    Participant in health and wellness plan  Comments:  needs blood sugar(non-fasting, ate breakfast) and cholesterol checked for wellness/insurance  Orders:  -     Lipid Panel with Direct LDL reflex; Future  -     POCT blood glucose      pt is seeing spine specialist later today to f/u    Subjective:      Patient ID: Brad Dominguez is a 56 y o  female  HPI    Here for follow up, reviewed meds with patient  Nathalie has had some neck pain/concerns over recent months, saw orthopedics and referred for PT  She took steroid taper and tried flexeril prn but has not been doing much better and is seeing spine surgery later today  No hx of neck problems in past and pain in lateral musculature with ROM  Has some radiation of pain to her shoulders & no imaging yet completed  Having wellness thru work and needs Blood sugar and cholesterol completed  Has had chicken pox in past and we discussed shingles(SHINGRIX) vaccine    Having some more stress with taking care of parents who are getting older and having trouble taking care of themselves, increased sertraline to 50mg once a day and doing better  No other complaints  Past Medical History:   Diagnosis Date    Daytime sleepiness     resolved 05/30/2017    History of oral aphthous ulcers     last assessed 04/01/2013    Migraine     last assessed 11/28/2016    Other specified menopausal and perimenopausal disorders (CODE)     last assessed 10/25/2013    Snoring     last assessed 08/29/2012    Wheezing     resolved 10/27/2014     Vitals:    08/27/18 0745   BP: 124/80   Pulse: 76   Resp: 16   Temp: 97 9 °F (36 6 °C)   SpO2: 97%   Weight: 85 3 kg (188 lb)   Height: 5' 5" (1 651 m)     Body mass index is 31 28 kg/m²  Current Outpatient Prescriptions:     montelukast (SINGULAIR) 10 mg tablet, , Disp: , Rfl:     rizatriptan (MAXALT-MLT) 10 MG disintegrating tablet, PLACE 1 TABLET ON TONGUE AT ONSET OF MIGRAINE, MAY REPEAT 1 TABLET IN 2 TO 4 HOURS  MAXIMUM OF 2 TABLETS PER DAY , Disp: 27 tablet, Rfl: 1    sertraline (ZOLOFT) 50 mg tablet, Take 1 tablet (50 mg total) by mouth daily at bedtime, Disp: 90 tablet, Rfl: 1    cyclobenzaprine (FLEXERIL) 5 mg tablet, Take 1 tablet (5 mg total) by mouth 3 (three) times a day as needed for muscle spasms (muscle spasm) (Patient not taking: Reported on 8/27/2018 ), Disp: 30 tablet, Rfl: 0  No Known Allergies      Review of Systems   Constitutional: Negative for fever  HENT: Negative for congestion  Eyes: Negative for visual disturbance  Respiratory: Negative for shortness of breath  Cardiovascular: Negative for chest pain  Gastrointestinal: Negative for abdominal pain  Genitourinary: Negative for difficulty urinating  Musculoskeletal: Positive for neck pain and neck stiffness  Allergic/Immunologic: Positive for environmental allergies  Neurological: Positive for headaches (but better, less often & improved with Maxalt prn)  Psychiatric/Behavioral: Negative for dysphoric mood  The patient is nervous/anxious (but improved with sertraline dose increase)  Objective:      /80   Pulse 76   Temp 97 9 °F (36 6 °C)   Resp 16   Ht 5' 5" (1 651 m)   Wt 85 3 kg (188 lb)   SpO2 97%   BMI 31 28 kg/m²          Physical Exam   Constitutional: She appears well-developed and well-nourished  HENT:   Head: Normocephalic and atraumatic  Mouth/Throat: Oropharynx is clear and moist    Eyes: Conjunctivae are normal  Pupils are equal, round, and reactive to light  Neck:   Neck pain with passive ROM in lateral potions of both sides   Cardiovascular: Normal rate, regular rhythm and normal heart sounds  No murmur heard  Pulmonary/Chest: Effort normal and breath sounds normal  She has no wheezes  She has no rales  Abdominal: Soft  Bowel sounds are normal  There is no tenderness  Musculoskeletal: She exhibits no edema  Neurological: She is alert  Psychiatric: She has a normal mood and affect  Her behavior is normal    Vitals reviewed          Results for orders placed or performed in visit on 08/27/18   POCT blood glucose   Result Value Ref Range     GLUCOSE

## 2018-08-31 LAB
25(OH)D3 SERPL-MCNC: 32 NG/ML (ref 30–100)
ALBUMIN SERPL-MCNC: 4.1 G/DL (ref 3.6–5.1)
ALBUMIN/GLOB SERPL: 1.9 (CALC) (ref 1–2.5)
ALP SERPL-CCNC: 74 U/L (ref 33–130)
ALT SERPL-CCNC: 21 U/L (ref 6–29)
AST SERPL-CCNC: 19 U/L (ref 10–35)
BILIRUB SERPL-MCNC: 0.4 MG/DL (ref 0.2–1.2)
BUN SERPL-MCNC: 11 MG/DL (ref 7–25)
BUN/CREAT SERPL: ABNORMAL (CALC) (ref 6–22)
CALCIUM SERPL-MCNC: 9 MG/DL (ref 8.6–10.4)
CHLORIDE SERPL-SCNC: 105 MMOL/L (ref 98–110)
CHOLEST SERPL-MCNC: 230 MG/DL
CHOLEST/HDLC SERPL: 3.2 (CALC)
CO2 SERPL-SCNC: 29 MMOL/L (ref 20–32)
CREAT SERPL-MCNC: 0.82 MG/DL (ref 0.5–1.05)
GLOBULIN SER CALC-MCNC: 2.2 G/DL (CALC) (ref 1.9–3.7)
GLUCOSE SERPL-MCNC: 106 MG/DL (ref 65–99)
HDLC SERPL-MCNC: 71 MG/DL
LDLC SERPL CALC-MCNC: 139 MG/DL (CALC)
NONHDLC SERPL-MCNC: 159 MG/DL (CALC)
POTASSIUM SERPL-SCNC: 4.2 MMOL/L (ref 3.5–5.3)
PROT SERPL-MCNC: 6.3 G/DL (ref 6.1–8.1)
SL AMB EGFR AFRICAN AMERICAN: 93 ML/MIN/1.73M2
SL AMB EGFR NON AFRICAN AMERICAN: 81 ML/MIN/1.73M2
SODIUM SERPL-SCNC: 141 MMOL/L (ref 135–146)
TRIGL SERPL-MCNC: 102 MG/DL

## 2018-09-17 ENCOUNTER — CONSULT (OUTPATIENT)
Dept: PAIN MEDICINE | Facility: CLINIC | Age: 58
End: 2018-09-17
Payer: COMMERCIAL

## 2018-09-17 VITALS
HEART RATE: 76 BPM | SYSTOLIC BLOOD PRESSURE: 128 MMHG | WEIGHT: 194 LBS | HEIGHT: 65 IN | BODY MASS INDEX: 32.32 KG/M2 | TEMPERATURE: 98.1 F | DIASTOLIC BLOOD PRESSURE: 76 MMHG

## 2018-09-17 DIAGNOSIS — M43.10 ANTEROLISTHESIS: ICD-10-CM

## 2018-09-17 DIAGNOSIS — M79.18 MYOFASCIAL PAIN SYNDROME: ICD-10-CM

## 2018-09-17 DIAGNOSIS — M54.2 NECK PAIN: Primary | ICD-10-CM

## 2018-09-17 PROCEDURE — 99244 OFF/OP CNSLTJ NEW/EST MOD 40: CPT | Performed by: ANESTHESIOLOGY

## 2018-09-17 RX ORDER — METHOCARBAMOL 750 MG/1
750 TABLET, FILM COATED ORAL
Qty: 30 TABLET | Refills: 0 | Status: SHIPPED | OUTPATIENT
Start: 2018-09-17 | End: 2018-11-30 | Stop reason: ALTCHOICE

## 2018-09-17 NOTE — PROGRESS NOTES
Assessment:  1  Neck pain    2  Anterolisthesis cervical     3  Myofascial pain syndrome        Plan:  Bhavesh Saleem is a 66 y o  female who presents for initial consultation for neck pain that has been present for the last 6 months  Patient presents today with neck pain, despite undergoing physical therapy and chiropractic care  She was also noted to have a slight anterolisthesis C4 and C5  Therefore, at this time I have ordered the patient an MRI of her cervical spine for further evaluation  The patient was instructed that our office will call her with results of her MRI once is completed  To help with the myofascial component of the patient's pain the patient will be started on methocarbamol 750 mg at bedtime  The patient was educated on the medications most common side effects which include dizziness and drowsiness  The patient was instructed to call the office in 1-2 weeks to update office on the effectiveness of this medication, or sooner with adverse medication side effects  The patient will follow up after the completion of her cervical MRI, or sooner with the  worsening of symptoms  My impressions and treatment recommendations were discussed in detail with the patient who verbalized understanding and had no further questions  Discharge instructions were provided  I personally saw and examined the patient and I agree with the above discussed plan of care  Orders Placed This Encounter   Procedures    MRI cervical spine wo contrast     Standing Status:   Future     Standing Expiration Date:   9/17/2022     Scheduling Instructions: There is no preparation for this test  Please leave your jewelry and valuables at home, wedding rings are the exception  Please bring your insurance cards, a form of photo ID and a list of your medications with you  Arrive 15 minutes prior to your appointment time in order to register   Please bring any prior CT or MRI studies of this area that were not performed at a [address]  To schedule this appointment, please contact Central Scheduling at [phone]  Order Specific Question:   Is the patient pregnant? Answer:   No     Order Specific Question:   What is the patient's sedation requirement? Answer:   No Sedation     New Medications Ordered This Visit   Medications    methocarbamol (ROBAXIN) 750 mg tablet     Sig: Take 1 tablet (750 mg total) by mouth daily at bedtime as needed for muscle spasms     Dispense:  30 tablet     Refill:  0       History of Present Illness:    Brad Dominguez is a 66 y o  female who presents for initial consultation for neck pain that has been present for the last 6 months  The patient is predominantly right-handed  She denies any predisposing injury trauma  She reports that her pain is moderate in nature occurring nearly constant  She reports that her pain is worsened during the nighttime hours  She currently rates her prior in a 5/10 numeric pain scale  She describes her pain as sharp, dull aching  She reports that the pain starts in her neck and radiates up the posterior aspect of her scalp  She reports intermittent headaches  She reports that her pain is decreased with relaxation  She reports no change in her pain with prior, bending, standing, sitting, walking, exercising, bowel movements  The patient reports that her pain is increased with lying down, coughing/sneezing  The patient's current treatment history includes physical therapy, exercise, heat/ice treatment  The patient is currently taking ibuprofen 600 mg as needed which provides moderate pain relief  I have personally reviewed and/or updated the patient's past medical history, past surgical history, family history, social history, current medications, allergies, and vital signs today  Review of Systems:    Review of Systems   Constitutional: Negative for fever and unexpected weight change     HENT: Negative for trouble swallowing  Eyes: Negative for visual disturbance  Respiratory: Negative for shortness of breath and wheezing  Cardiovascular: Negative for chest pain and palpitations  Gastrointestinal: Negative for constipation, diarrhea, nausea and vomiting  Endocrine: Negative for cold intolerance, heat intolerance and polydipsia  Genitourinary: Negative for difficulty urinating and frequency  Musculoskeletal: Positive for joint swelling  Negative for arthralgias, gait problem and myalgias  Skin: Negative for rash  Neurological: Negative for dizziness, seizures, syncope, weakness and headaches  Hematological: Does not bruise/bleed easily  Psychiatric/Behavioral: Negative for dysphoric mood  Behavioral problem:      All other systems reviewed and are negative        Patient Active Problem List   Diagnosis    Anxiety    Migraine without aura    Allergic rhinitis    Asthma, exercise induced    Hypercholesterolemia    Lumbar herniated disc    Overweight    Low vitamin D level    Aimee-menopause    Chronic neck pain    Eczema of both hands    Polyp of colon       Past Medical History:   Diagnosis Date    Daytime sleepiness     resolved 05/30/2017    History of oral aphthous ulcers     last assessed 04/01/2013    Migraine     last assessed 11/28/2016    Other specified menopausal and perimenopausal disorders (CODE)     last assessed 10/25/2013    Snoring     last assessed 08/29/2012    Wheezing     resolved 10/27/2014       Past Surgical History:   Procedure Laterality Date    COLONOSCOPY      jun 2009 normal dr Kasandra Adams    ESOPHAGOGASTRODUODENOSCOPY      gastritis dr Hanna Douglas 11/10/09    NASAL SEPTUM SURGERY      last assessed 05/30/2017    SHOULDER ARTHROSCOPY Left 02/05/2011    subacronical decompression lysis of adhesant and labrial debridment dr Corey Lopez Bilateral     for bone spurs in past, last assessed 03/11/2016    TONSILLECTOMY AND ADENOIDECTOMY Family History   Problem Relation Age of Onset    Colon cancer Mother         dx'd in 55s    Other Father         carotid artery stenosis per allscripts    Hypertension Father     Panic disorder Sister         without agoraphobia per allcripts    Alcohol abuse Neg Hx     Substance Abuse Neg Hx     Mental illness Neg Hx     Depression Neg Hx        Social History     Occupational History    bookeeper      Social History Main Topics    Smoking status: Never Smoker    Smokeless tobacco: Never Used      Comment: tried as a teenager no cigs since    Alcohol use Yes      Comment: social, rarely per allscripts    Drug use: No    Sexual activity: Not on file       Current Outpatient Prescriptions on File Prior to Visit   Medication Sig    montelukast (SINGULAIR) 10 mg tablet     rizatriptan (MAXALT-MLT) 10 MG disintegrating tablet PLACE 1 TABLET ON TONGUE AT ONSET OF MIGRAINE, MAY REPEAT 1 TABLET IN 2 TO 4 HOURS  MAXIMUM OF 2 TABLETS PER DAY   sertraline (ZOLOFT) 50 mg tablet Take 1 tablet (50 mg total) by mouth daily at bedtime     No current facility-administered medications on file prior to visit  No Known Allergies    Physical Exam:    /76   Pulse 76   Temp 98 1 °F (36 7 °C) (Oral)   Ht 5' 5" (1 651 m)   Wt 88 kg (194 lb)   BMI 32 28 kg/m²     Constitutional: normal, well developed, well nourished, alert, in no distress and non-toxic and no overt pain behavior   and obese  Eyes: anicteric  HEENT: grossly intact  Neck: supple, symmetric, trachea midline and no masses   Pulmonary:even and unlabored  Cardiovascular:No edema or pitting edema present  Skin:Normal without rashes or lesions and well hydrated  Psychiatric:Mood and affect appropriate  Neurologic:Cranial Nerves II-XII grossly intact  Musculoskeletal:normal     Cervical Spine Exam    Appearance:  Normal lordosis  Palpation/Tenderness:  left cervical paraspinal tenderness  right cervical paraspinal tenderness  Sensory:  no sensory deficits noted  Range of Motion:  Flexion: Moderately limited  with pain  Extension:  Moderately limited  with pain  Lateral Flexion - Left:  Moderately limited  with pain  Lateral Flexion - Right:  Moderately limited  with pain  Rotation - Left:  Moderately limited  with pain  Rotation - Right:  Moderately limited  with pain  Motor Strength:  Left Arm Flexion  5/5  Left Arm Extension  5/5  Right Arm Flexion  5/5  Right Arm Extension  5/5  Left Wrist Flexion  5/5  Left Wrist Extension  5/5  Right wrist flexion 5/5  Right wrist extension 5/5  Left Finger Abduction  5/5  Right Finger Abduction  5/5  Left    5/5  Right   5/5  Special Tests:  Left Spurlings:  negative  Right Spurlings  negative       Imaging    CERVICAL SPINE     INDICATION:   M54 2: Cervicalgia  Chronic neck pain for 5 months, sometimes extending into the shoulders bilaterally      COMPARISON:  None     VIEWS:  XR SPINE CERVICAL 2 OR 3 VW INJURY   Images: 3     FINDINGS:     No evidence of fracture       Straightening of the cervical lordosis may be related to patient positioning or muscle spasm  Mild anterolisthesis C4 on C5      The intervertebral disc spaces are preserved  Multilevel facet arthropathy suggested      The prevertebral soft tissues are within normal limits        The lung apices are clear      IMPRESSION:     No acute osseous abnormality      Degenerative changes as above

## 2018-09-25 NOTE — PROGRESS NOTES
Patient has been discharged from PT at this time  She did very well but continues to report pain in the cervical spine  She was compliant with prescribed PT sessions and HEP    Thank you for your referral

## 2018-10-01 ENCOUNTER — CLINICAL SUPPORT (OUTPATIENT)
Dept: INTERNAL MEDICINE CLINIC | Facility: CLINIC | Age: 58
End: 2018-10-01
Payer: COMMERCIAL

## 2018-10-01 DIAGNOSIS — Z23 FLU VACCINE NEED: Primary | ICD-10-CM

## 2018-10-01 PROCEDURE — 90471 IMMUNIZATION ADMIN: CPT

## 2018-10-01 PROCEDURE — 90682 RIV4 VACC RECOMBINANT DNA IM: CPT

## 2018-10-04 ENCOUNTER — HOSPITAL ENCOUNTER (OUTPATIENT)
Dept: MRI IMAGING | Facility: HOSPITAL | Age: 58
Discharge: HOME | End: 2018-10-04
Payer: COMMERCIAL

## 2018-10-04 DIAGNOSIS — M43.10 ANTEROLISTHESIS: ICD-10-CM

## 2018-10-04 DIAGNOSIS — M54.2 NECK PAIN: ICD-10-CM

## 2018-10-04 PROCEDURE — 72141 MRI NECK SPINE W/O DYE: CPT

## 2018-10-09 ENCOUNTER — TELEPHONE (OUTPATIENT)
Dept: PAIN MEDICINE | Facility: MEDICAL CENTER | Age: 58
End: 2018-10-09

## 2018-10-09 NOTE — TELEPHONE ENCOUNTER
Pt is calling to see if Dr Yi Schwarz can review her MRI results and have someone call to go results with her   Pt can be reached at [phone]

## 2018-10-23 ENCOUNTER — HOSPITAL ENCOUNTER (OUTPATIENT)
Dept: RADIOLOGY | Facility: CLINIC | Age: 58
Discharge: HOME | End: 2018-10-23
Attending: ANESTHESIOLOGY
Payer: COMMERCIAL

## 2018-10-23 VITALS
RESPIRATION RATE: 20 BRPM | DIASTOLIC BLOOD PRESSURE: 77 MMHG | SYSTOLIC BLOOD PRESSURE: 146 MMHG | OXYGEN SATURATION: 97 % | TEMPERATURE: 98.1 F | HEART RATE: 69 BPM

## 2018-10-23 DIAGNOSIS — M50.120 CERVICAL DISC DISORDER WITH RADICULOPATHY OF MID-CERVICAL REGION: ICD-10-CM

## 2018-10-23 PROCEDURE — 62321 NJX INTERLAMINAR CRV/THRC: CPT | Performed by: ANESTHESIOLOGY

## 2018-10-23 RX ORDER — LIDOCAINE HYDROCHLORIDE 10 MG/ML
5 INJECTION, SOLUTION EPIDURAL; INFILTRATION; INTRACAUDAL; PERINEURAL ONCE
Status: COMPLETED | OUTPATIENT
Start: 2018-10-23 | End: 2018-10-23

## 2018-10-23 RX ORDER — METHYLPREDNISOLONE ACETATE 80 MG/ML
80 INJECTION, SUSPENSION INTRA-ARTICULAR; INTRALESIONAL; INTRAMUSCULAR; PARENTERAL; SOFT TISSUE ONCE
Status: COMPLETED | OUTPATIENT
Start: 2018-10-23 | End: 2018-10-23

## 2018-10-23 RX ADMIN — LIDOCAINE HYDROCHLORIDE 4 ML: 10 INJECTION, SOLUTION EPIDURAL; INFILTRATION; INTRACAUDAL; PERINEURAL at 08:30

## 2018-10-23 RX ADMIN — METHYLPREDNISOLONE ACETATE 80 MG: 80 INJECTION, SUSPENSION INTRA-ARTICULAR; INTRALESIONAL; INTRAMUSCULAR; PARENTERAL; SOFT TISSUE at 08:34

## 2018-10-23 RX ADMIN — IOHEXOL 1 ML: 300 INJECTION, SOLUTION INTRAVENOUS at 08:34

## 2018-10-23 NOTE — H&P
History of Present Illness: The patient is a 66 y o  female who presents with complaints of neck pain secondary to cervical bulging disc and is here today for cervical epidural steroid injection  Patient Active Problem List   Diagnosis    Anxiety    Migraine without aura    Allergic rhinitis    Asthma, exercise induced    Hypercholesterolemia    Lumbar herniated disc    Overweight    Low vitamin D level    Aimee-menopause    Chronic neck pain    Eczema of both hands    Polyp of colon    Flu vaccine need       Past Medical History:   Diagnosis Date    Daytime sleepiness     resolved 05/30/2017    History of oral aphthous ulcers     last assessed 04/01/2013    Migraine     last assessed 11/28/2016    Other specified menopausal and perimenopausal disorders (CODE)     last assessed 10/25/2013    Snoring     last assessed 08/29/2012    Wheezing     resolved 10/27/2014       Past Surgical History:   Procedure Laterality Date    COLONOSCOPY      jun 2009 normal dr Thomas Bowels    ESOPHAGOGASTRODUODENOSCOPY      gastritis dr Harold Homans 11/10/09    NASAL SEPTUM SURGERY      last assessed 05/30/2017    SHOULDER ARTHROSCOPY Left 02/05/2011    subacronical decompression lysis of adhesant and labrial debridment dr Citlalli Cabrera Bilateral     for bone spurs in past, last assessed 03/11/2016    TONSILLECTOMY AND ADENOIDECTOMY           Current Outpatient Prescriptions:     methocarbamol (ROBAXIN) 750 mg tablet, Take 1 tablet (750 mg total) by mouth daily at bedtime as needed for muscle spasms, Disp: 30 tablet, Rfl: 0    montelukast (SINGULAIR) 10 mg tablet, , Disp: , Rfl:     rizatriptan (MAXALT-MLT) 10 MG disintegrating tablet, PLACE 1 TABLET ON TONGUE AT ONSET OF MIGRAINE, MAY REPEAT 1 TABLET IN 2 TO 4 HOURS   MAXIMUM OF 2 TABLETS PER DAY , Disp: 27 tablet, Rfl: 1    sertraline (ZOLOFT) 50 mg tablet, Take 1 tablet (50 mg total) by mouth daily at bedtime, Disp: 90 tablet, Rfl: 1    Current Facility-Administered Medications:     iohexol (OMNIPAQUE) 300 mg/mL injection 50 mL, 50 mL, Epidural, Once, Lebron Pinedo MD    lidocaine (PF) (XYLOCAINE-MPF) 1 % injection 5 mL, 5 mL, Infiltration, Once, Lebron Pinedo MD    methylPREDNISolone acetate (DEPO-MEDROL) injection 80 mg, 80 mg, Epidural, Once, Lebron Pinedo MD    No Known Allergies    Physical Exam:   Vitals:    10/23/18 0817   BP: 146/77   Pulse: 69   Resp: 18   Temp: 98 1 °F (36 7 °C)   SpO2: 99%     General: Awake, Alert, Oriented x 3, Mood and affect appropriate  Respiratory: Respirations even and unlabored  Cardiovascular: Peripheral pulses intact; no edema  Musculoskeletal Exam:   Neck tenderness    ASA Score: 2    Patient/Chart Verification  Patient ID Verified: Verbal  ID Band Applied: No  Consents Confirmed: Procedural, To be obtained in the Pre-Procedure area  H&P( within 30 days) Verified: To be obtained in the Pre-Procedure area  Interval H&P(within 24 hr) Complete (required for Outpatients and Surgery Admit only): To be obtained in the Pre-Procedure area  Allergies Reviewed: Yes  Anticoag/NSAID held?: NA  Currently on antibiotics?: No  Pregnancy denied?: NA    Assessment:   1   Cervical disc disorder with radiculopathy of mid-cervical region        Plan: [place]

## 2018-10-23 NOTE — DISCHARGE INSTR - LAB
Epidural Steroid Injection   WHAT YOU NEED TO KNOW:   An epidural steroid injection (IGNACIA) is a procedure to inject steroid medicine into the epidural space  The epidural space is between your spinal cord and vertebrae  Steroids reduce inflammation and fluid buildup in your spine that may be causing pain  You may be given pain medicine along with the steroids  ACTIVITY  · Do not drive or operate machinery today  · No strenuous activity today - bending, lifting, etc   · You may resume normal activites starting tomorrow - start slowly and as tolerated  · You may shower today, but no tub baths or hot tubs  · You may have numbness for several hours from the local anesthetic  Please use caution and common sense, especially with weight-bearing activities  CARE OF THE INJECTION SITE  · If you have soreness or pain, apply ice to the area today (20 minutes on/20 minutes off)  · Starting tomorrow, you may use warm, moist heat or ice if needed  · You may have an increase or change in your discomfort for 36-48 hours after your treatment  · Apply ice and continue with any pain medication you have been prescribed  · Notify [facility] if you have any of the following: redness, drainage, swelling, headache, stiff neck or fever above 100°F     SPECIAL INSTRUCTIONS  · Our office will contact you in approximately 7 days for a progress report  MEDICATIONS  · Continue to take all routine medications  · Our office may have instructed you to hold some medications  If you have a problem specifically related to your procedure, please call our office at [phone]  Problems not related to your procedure should be directed to your primary care physician

## 2018-10-30 ENCOUNTER — TELEPHONE (OUTPATIENT)
Dept: PAIN MEDICINE | Facility: CLINIC | Age: 58
End: 2018-10-30

## 2018-10-30 NOTE — TELEPHONE ENCOUNTER
Spoke with patient, returning nurse call, pain level is a 3, relief of pain is 50 %,  patient can be reached on [phone]

## 2018-11-26 DIAGNOSIS — G43.909 MIGRAINE WITHOUT STATUS MIGRAINOSUS, NOT INTRACTABLE, UNSPECIFIED MIGRAINE TYPE: ICD-10-CM

## 2018-11-26 RX ORDER — RIZATRIPTAN BENZOATE 10 MG/1
TABLET, ORALLY DISINTEGRATING ORAL
Qty: 27 TABLET | Refills: 1 | Status: SHIPPED | OUTPATIENT
Start: 2018-11-26 | End: 2019-03-11 | Stop reason: SDUPTHER

## 2018-11-30 ENCOUNTER — HOSPITAL ENCOUNTER (OUTPATIENT)
Dept: RADIOLOGY | Facility: CLINIC | Age: 58
Discharge: HOME | End: 2018-11-30
Attending: ANESTHESIOLOGY | Admitting: ANESTHESIOLOGY
Payer: COMMERCIAL

## 2018-11-30 VITALS
TEMPERATURE: 98.2 F | RESPIRATION RATE: 18 BRPM | OXYGEN SATURATION: 99 % | SYSTOLIC BLOOD PRESSURE: 124 MMHG | HEART RATE: 59 BPM | DIASTOLIC BLOOD PRESSURE: 83 MMHG

## 2018-11-30 DIAGNOSIS — M50.120 CERVICAL DISC DISORDER WITH RADICULOPATHY OF MID-CERVICAL REGION: ICD-10-CM

## 2018-11-30 PROCEDURE — 62321 NJX INTERLAMINAR CRV/THRC: CPT | Performed by: ANESTHESIOLOGY

## 2018-11-30 RX ORDER — LIDOCAINE HYDROCHLORIDE 10 MG/ML
5 INJECTION, SOLUTION EPIDURAL; INFILTRATION; INTRACAUDAL; PERINEURAL ONCE
Status: COMPLETED | OUTPATIENT
Start: 2018-11-30 | End: 2018-11-30

## 2018-11-30 RX ORDER — METHYLPREDNISOLONE ACETATE 80 MG/ML
80 INJECTION, SUSPENSION INTRA-ARTICULAR; INTRALESIONAL; INTRAMUSCULAR; PARENTERAL; SOFT TISSUE ONCE
Status: COMPLETED | OUTPATIENT
Start: 2018-11-30 | End: 2018-11-30

## 2018-11-30 RX ADMIN — IOHEXOL 1 ML: 300 INJECTION, SOLUTION INTRAVENOUS at 13:16

## 2018-11-30 RX ADMIN — LIDOCAINE HYDROCHLORIDE 4 ML: 10 INJECTION, SOLUTION EPIDURAL; INFILTRATION; INTRACAUDAL; PERINEURAL at 13:12

## 2018-11-30 RX ADMIN — METHYLPREDNISOLONE ACETATE 80 MG: 80 INJECTION, SUSPENSION INTRA-ARTICULAR; INTRALESIONAL; INTRAMUSCULAR; PARENTERAL; SOFT TISSUE at 13:16

## 2018-11-30 NOTE — DISCHARGE INSTR - LAB
Epidural Steroid Injection   WHAT YOU NEED TO KNOW:   An epidural steroid injection (IGNACIA) is a procedure to inject steroid medicine into the epidural space  The epidural space is between your spinal cord and vertebrae  Steroids reduce inflammation and fluid buildup in your spine that may be causing pain  You may be given pain medicine along with the steroids  ACTIVITY  · Do not drive or operate machinery today  · No strenuous activity today - bending, lifting, etc   · You may resume normal activites starting tomorrow - start slowly and as tolerated  · You may shower today, but no tub baths or hot tubs  · You may have numbness for several hours from the local anesthetic  Please use caution and common sense, especially with weight-bearing activities  CARE OF THE INJECTION SITE  · If you have soreness or pain, apply ice to the area today (20 minutes on/20 minutes off)  · Starting tomorrow, you may use warm, moist heat or ice if needed  · You may have an increase or change in your discomfort for 36-48 hours after your treatment  · Apply ice and continue with any pain medication you have been prescribed  · Notify [facility] if you have any of the following: redness, drainage, swelling, headache, stiff neck or fever above 100°F     SPECIAL INSTRUCTIONS  · Our office will contact you in approximately 7 days for a progress report  MEDICATIONS  · Continue to take all routine medications  · Our office may have instructed you to hold some medications  If you have a problem specifically related to your procedure, please call our office at [phone]  Problems not related to your procedure should be directed to your primary care physician

## 2018-11-30 NOTE — H&P
History of Present Illness: The patient is a 66 y o  female who presents with complaints of neck and arm pain secondary to cervical disc bulging and is here today for cervical epidural steroid injection  Patient Active Problem List   Diagnosis    Anxiety    Migraine without aura    Allergic rhinitis    Asthma, exercise induced    Hypercholesterolemia    Lumbar herniated disc    Overweight    Low vitamin D level    Aimee-menopause    Chronic neck pain    Eczema of both hands    Polyp of colon    Flu vaccine need    Cervical disc disorder with radiculopathy of mid-cervical region       Past Medical History:   Diagnosis Date    Daytime sleepiness     resolved 05/30/2017    History of oral aphthous ulcers     last assessed 04/01/2013    Migraine     last assessed 11/28/2016    Other specified menopausal and perimenopausal disorders (CODE)     last assessed 10/25/2013    Snoring     last assessed 08/29/2012    Wheezing     resolved 10/27/2014       Past Surgical History:   Procedure Laterality Date    COLONOSCOPY      jun 2009 normal dr Sharif Fuel    ESOPHAGOGASTRODUODENOSCOPY      gastritis dr Valentin Bowers 11/10/09    NASAL SEPTUM SURGERY      last assessed 05/30/2017    SHOULDER ARTHROSCOPY Left 02/05/2011    subacronical decompression lysis of adhesant and labrial debridment dr Hou Watson Bilateral     for bone spurs in past, last assessed 03/11/2016    TONSILLECTOMY AND ADENOIDECTOMY           Current Outpatient Prescriptions:     ibuprofen (ADVIL,MOTRIN) 100 MG tablet, Take 100 mg by mouth every 6 (six) hours as needed for mild pain, Disp: , Rfl:     montelukast (SINGULAIR) 10 mg tablet, , Disp: , Rfl:     rizatriptan (MAXALT-MLT) 10 MG disintegrating tablet, PLACE 1 TABLET ON TONGUE AT ONSET OF MIGRAINE, MAY REPEAT 1 TABLET IN 2 TO 4 HOURS   MAXIMUM OF 2 TABLETS PER DAY , Disp: 27 tablet, Rfl: 1    sertraline (ZOLOFT) 50 mg tablet, Take 1 tablet (50 mg total) by mouth daily at bedtime, Disp: 90 tablet, Rfl: 1    No Known Allergies    Physical Exam: There were no vitals filed for this visit  General: Awake, Alert, Oriented x 3, Mood and affect appropriate  Respiratory: Respirations even and unlabored  Cardiovascular: Peripheral pulses intact; no edema  Musculoskeletal Exam:   Neck tenderness    ASA Score: 2    Patient/Chart Verification  Patient ID Verified: Verbal  Consents Confirmed: Procedural, To be obtained in the Pre-Procedure area  H&P( within 30 days) Verified: To be obtained in the Pre-Procedure area  Interval H&P(within 24 hr) Complete (required for Outpatients and Surgery Admit only): To be obtained in the Pre-Procedure area  Allergies Reviewed: Yes  Anticoag/NSAID held?: NA  Currently on antibiotics?: No    Assessment:   1   Cervical disc disorder with radiculopathy of mid-cervical region        Plan: [place]

## 2019-02-05 DIAGNOSIS — F41.9 ANXIETY: ICD-10-CM

## 2019-03-11 ENCOUNTER — OFFICE VISIT (OUTPATIENT)
Dept: INTERNAL MEDICINE CLINIC | Facility: CLINIC | Age: 59
End: 2019-03-11
Payer: COMMERCIAL

## 2019-03-11 VITALS
RESPIRATION RATE: 18 BRPM | SYSTOLIC BLOOD PRESSURE: 118 MMHG | BODY MASS INDEX: 30.49 KG/M2 | DIASTOLIC BLOOD PRESSURE: 80 MMHG | HEIGHT: 65 IN | WEIGHT: 183 LBS | HEART RATE: 73 BPM | OXYGEN SATURATION: 98 % | TEMPERATURE: 98.3 F

## 2019-03-11 DIAGNOSIS — E66.9 CLASS 1 OBESITY WITHOUT SERIOUS COMORBIDITY WITH BODY MASS INDEX (BMI) OF 30.0 TO 30.9 IN ADULT, UNSPECIFIED OBESITY TYPE: ICD-10-CM

## 2019-03-11 DIAGNOSIS — Z12.31 ENCOUNTER FOR SCREENING MAMMOGRAM FOR MALIGNANT NEOPLASM OF BREAST: ICD-10-CM

## 2019-03-11 DIAGNOSIS — Z23 NEED FOR SHINGLES VACCINE: ICD-10-CM

## 2019-03-11 DIAGNOSIS — K21.00 GASTROESOPHAGEAL REFLUX DISEASE WITH ESOPHAGITIS: ICD-10-CM

## 2019-03-11 DIAGNOSIS — J30.2 SEASONAL ALLERGIC RHINITIS, UNSPECIFIED TRIGGER: Primary | ICD-10-CM

## 2019-03-11 DIAGNOSIS — G43.909 MIGRAINE WITHOUT STATUS MIGRAINOSUS, NOT INTRACTABLE, UNSPECIFIED MIGRAINE TYPE: ICD-10-CM

## 2019-03-11 DIAGNOSIS — F41.9 ANXIETY: ICD-10-CM

## 2019-03-11 DIAGNOSIS — Z86.2 HISTORY OF IRON DEFICIENCY ANEMIA: ICD-10-CM

## 2019-03-11 DIAGNOSIS — E78.00 HYPERCHOLESTEROLEMIA: ICD-10-CM

## 2019-03-11 PROBLEM — R92.30 DENSE BREAST TISSUE: Status: ACTIVE | Noted: 2018-05-01

## 2019-03-11 PROBLEM — Z80.0 FAMILY HISTORY OF COLON CANCER IN MOTHER: Status: ACTIVE | Noted: 2018-05-01

## 2019-03-11 PROBLEM — R92.2 DENSE BREAST TISSUE: Status: ACTIVE | Noted: 2018-05-01

## 2019-03-11 PROCEDURE — 1036F TOBACCO NON-USER: CPT | Performed by: INTERNAL MEDICINE

## 2019-03-11 PROCEDURE — 99214 OFFICE O/P EST MOD 30 MIN: CPT | Performed by: INTERNAL MEDICINE

## 2019-03-11 PROCEDURE — 3008F BODY MASS INDEX DOCD: CPT | Performed by: INTERNAL MEDICINE

## 2019-03-11 RX ORDER — RIZATRIPTAN BENZOATE 10 MG/1
TABLET, ORALLY DISINTEGRATING ORAL
Qty: 27 TABLET | Refills: 1 | Status: SHIPPED | OUTPATIENT
Start: 2019-03-11 | End: 2019-04-29 | Stop reason: SDUPTHER

## 2019-03-11 RX ORDER — MONTELUKAST SODIUM 10 MG/1
10 TABLET ORAL
Qty: 90 TABLET | Refills: 1 | Status: SHIPPED | OUTPATIENT
Start: 2019-03-11

## 2019-03-11 RX ORDER — FAMOTIDINE 40 MG/1
40 TABLET, FILM COATED ORAL DAILY
Refills: 2 | COMMUNITY
Start: 2019-02-22 | End: 2020-10-26

## 2019-03-11 NOTE — PATIENT INSTRUCTIONS
1  Medications refilled  2  Fasting blood work  3  Keep up good work  4   Shingrix(shingles) vaccine

## 2019-04-29 DIAGNOSIS — G43.909 MIGRAINE WITHOUT STATUS MIGRAINOSUS, NOT INTRACTABLE, UNSPECIFIED MIGRAINE TYPE: ICD-10-CM

## 2019-04-29 RX ORDER — RIZATRIPTAN BENZOATE 10 MG/1
TABLET, ORALLY DISINTEGRATING ORAL
Qty: 27 TABLET | Refills: 1 | Status: SHIPPED | OUTPATIENT
Start: 2019-04-29 | End: 2019-12-06 | Stop reason: SDUPTHER

## 2019-07-29 DIAGNOSIS — F41.9 ANXIETY: Primary | ICD-10-CM

## 2019-07-29 RX ORDER — ALPRAZOLAM 0.25 MG/1
TABLET ORAL
Refills: 0 | Status: CANCELLED | OUTPATIENT
Start: 2019-07-29

## 2019-07-29 RX ORDER — LORAZEPAM 0.5 MG/1
0.5 TABLET ORAL DAILY PRN
Qty: 20 TABLET | Refills: 0 | Status: SHIPPED | OUTPATIENT
Start: 2019-07-29 | End: 2019-11-25 | Stop reason: SDUPTHER

## 2019-07-29 RX ORDER — ALPRAZOLAM 0.5 MG/1
TABLET ORAL AS NEEDED
Refills: 0 | Status: CANCELLED | OUTPATIENT
Start: 2019-07-29

## 2019-07-29 NOTE — TELEPHONE ENCOUNTER
Last time I ordered lorazepam(ativan) and not alprazolam    Did lorazepam work okay?   If yes, I can sent this rx again to [facility]    Let me know

## 2019-07-29 NOTE — TELEPHONE ENCOUNTER
Patient would prefer 90 day supply if so it needs to [facility] , if that cannot be done [facility] [address]      she states she hasn't had this filled in awhile & uses it prn , I didn't choose an amount to dispense      #[phone] call patient w/ questions

## 2019-08-05 DIAGNOSIS — F41.9 ANXIETY: ICD-10-CM

## 2019-09-12 ENCOUNTER — OFFICE VISIT (OUTPATIENT)
Dept: INTERNAL MEDICINE CLINIC | Facility: CLINIC | Age: 59
End: 2019-09-12
Payer: COMMERCIAL

## 2019-09-12 VITALS
TEMPERATURE: 97.6 F | HEART RATE: 83 BPM | BODY MASS INDEX: 30.86 KG/M2 | HEIGHT: 65 IN | SYSTOLIC BLOOD PRESSURE: 116 MMHG | WEIGHT: 185.2 LBS | DIASTOLIC BLOOD PRESSURE: 64 MMHG | OXYGEN SATURATION: 97 %

## 2019-09-12 DIAGNOSIS — Z11.59 NEED FOR HEPATITIS C SCREENING TEST: ICD-10-CM

## 2019-09-12 DIAGNOSIS — J30.2 SEASONAL ALLERGIC RHINITIS, UNSPECIFIED TRIGGER: Primary | ICD-10-CM

## 2019-09-12 DIAGNOSIS — F41.9 ANXIETY: ICD-10-CM

## 2019-09-12 DIAGNOSIS — Z23 FLU VACCINE NEED: ICD-10-CM

## 2019-09-12 DIAGNOSIS — E78.5 DYSLIPIDEMIA: ICD-10-CM

## 2019-09-12 DIAGNOSIS — K21.00 GASTROESOPHAGEAL REFLUX DISEASE WITH ESOPHAGITIS: ICD-10-CM

## 2019-09-12 PROBLEM — Z51.6 ALLERGY DESENSITIZATION THERAPY: Status: ACTIVE | Noted: 2019-09-12

## 2019-09-12 PROCEDURE — 99214 OFFICE O/P EST MOD 30 MIN: CPT | Performed by: INTERNAL MEDICINE

## 2019-09-12 PROCEDURE — 90682 RIV4 VACC RECOMBINANT DNA IM: CPT

## 2019-09-12 PROCEDURE — 3008F BODY MASS INDEX DOCD: CPT | Performed by: INTERNAL MEDICINE

## 2019-09-12 PROCEDURE — 90471 IMMUNIZATION ADMIN: CPT

## 2019-09-12 RX ORDER — MONTELUKAST SODIUM 10 MG/1
10 TABLET ORAL
Qty: 90 TABLET | Refills: 1 | Status: CANCELLED | OUTPATIENT
Start: 2019-09-12

## 2019-09-12 NOTE — PROGRESS NOTES
Assessment/Plan:     Diagnoses and all orders for this visit:    Seasonal allergic rhinitis, unspecified trigger  Comments:  taking singulair and undergoing allergy shots with some relief, c/w current rx    Anxiety  Comments:  doing well, c/w low dose sertraline and f/u in 6 mos    Gastroesophageal reflux disease with esophagitis  Comments:  much improved with medical therapy s/p EGD, taking H2 blocker & GI advising to taper off rx   f/u 6 mos    Dyslipidemia  Comments:  not on cholesterol lowering therapy  pt wanted to work on diet/exercise since last lipid profile in 2018  re-check chol BW now    Need for hepatitis C screening test  Comments:  never had before -> ordered  Orders:  -     Hepatitis C antibody; Future    Flu vaccine need  -     influenza vaccine, [id number], quadrivalent, recombinant, PF, 0 5 mL, for patients 18 yr+ (FLUBLOK)    Other orders  -     Cancel: montelukast (SINGULAIR) 10 mg tablet; Take 1 tablet (10 mg total) by mouth daily at bedtime        Subjective:      Patient ID: Clari Celeste is a 64 y o  female  HPI    Here for follow up, overall doing well  Taking singulair and receiving allergy shots and symptoms have been stable  Stress level is bit better this year & anxiety overall controlled  Nathalie requests flu vaccine today and form for wellness for work  She is still taking H2 blocker from GI for GERD with esophagitis, was recommended to stop rx but still has some heartburn even while taking pepcid  Okay to receive flublok(recombinant) flu vaccine today  Mammogram UTD(outside network)  No other complaints      Past Medical History:   Diagnosis Date    Daytime sleepiness     resolved 05/30/2017    History of oral aphthous ulcers     last assessed 04/01/2013    Migraine     last assessed 11/28/2016    Other specified menopausal and perimenopausal disorders (CODE)     last assessed 10/25/2013    Snoring     last assessed 08/29/2012     Vitals:    09/12/19 0803   BP: 116/64 BP Location: Left arm   Patient Position: Sitting   Cuff Size: Standard   Pulse: 83   Temp: 97 6 °F (36 4 °C)   SpO2: 97%   Weight: 84 kg (185 lb 3 2 oz)   Height: 5' 5" (1 651 m)     Body mass index is 30 82 kg/m²  Current Outpatient Medications:     famotidine (PEPCID) 40 MG tablet, Take 40 mg by mouth daily, Disp: , Rfl: 2    LORazepam (ATIVAN) 0 5 mg tablet, Take 1 tablet (0 5 mg total) by mouth daily as needed for anxiety, Disp: 20 tablet, Rfl: 0    montelukast (SINGULAIR) 10 mg tablet, Take 1 tablet (10 mg total) by mouth daily at bedtime, Disp: 90 tablet, Rfl: 1    rizatriptan (MAXALT-MLT) 10 MG disintegrating tablet, Place 1 tablet on tongue at onset of migraine, may repeat in 2 hours if needed  Maximum of 2 tablets per day, Disp: 27 tablet, Rfl: 1    sertraline (ZOLOFT) 50 mg tablet, TAKE 1 TABLET DAILY AT BEDTIME (DOSE CHANGE), Disp: 90 tablet, Rfl: 1  No Known Allergies      Review of Systems   Constitutional: Negative for fever  HENT: Negative for congestion  Eyes: Negative for visual disturbance  Respiratory: Negative for shortness of breath  Cardiovascular: Negative for chest pain  Gastrointestinal: Negative for abdominal pain  Endocrine: Negative for polyuria  Genitourinary: Negative for dysuria  Musculoskeletal: Negative for arthralgias  Skin: Negative for rash  Allergic/Immunologic: Positive for environmental allergies  Neurological: Negative for headaches  Psychiatric/Behavioral: The patient is not nervous/anxious (much improved)  Objective:      /64 (BP Location: Left arm, Patient Position: Sitting, Cuff Size: Standard)   Pulse 83   Temp 97 6 °F (36 4 °C)   Ht 5' 5" (1 651 m)   Wt 84 kg (185 lb 3 2 oz)   SpO2 97%   BMI 30 82 kg/m²          Physical Exam   Constitutional: She appears well-developed and well-nourished  HENT:   Head: Normocephalic and atraumatic  Eyes: Pupils are equal, round, and reactive to light     Cardiovascular: Normal rate and regular rhythm  No murmur heard  Pulmonary/Chest: Effort normal and breath sounds normal  She has no wheezes  She has no rales  Abdominal: Soft  Bowel sounds are normal  There is no tenderness  Musculoskeletal: She exhibits no edema  Neurological: She is alert  Psychiatric: She has a normal mood and affect  Her behavior is normal    Vitals reviewed

## 2019-10-01 ENCOUNTER — TELEPHONE (OUTPATIENT)
Dept: INTERNAL MEDICINE CLINIC | Facility: CLINIC | Age: 59
End: 2019-10-01

## 2019-10-01 DIAGNOSIS — E78.5 DYSLIPIDEMIA: Primary | ICD-10-CM

## 2019-10-01 LAB
BASOPHILS # BLD AUTO: 41 CELLS/UL (ref 0–200)
BASOPHILS NFR BLD AUTO: 1.2 %
BUN SERPL-MCNC: 19 MG/DL (ref 7–25)
BUN/CREAT SERPL: NORMAL (CALC) (ref 6–22)
CALCIUM SERPL-MCNC: 9.1 MG/DL (ref 8.6–10.4)
CHLORIDE SERPL-SCNC: 102 MMOL/L (ref 98–110)
CHOLEST SERPL-MCNC: 254 MG/DL
CHOLEST/HDLC SERPL: 3.3 (CALC)
CO2 SERPL-SCNC: 27 MMOL/L (ref 20–32)
CREAT SERPL-MCNC: 0.87 MG/DL (ref 0.5–1.05)
EOSINOPHIL # BLD AUTO: 112 CELLS/UL (ref 15–500)
EOSINOPHIL NFR BLD AUTO: 3.3 %
ERYTHROCYTE [DISTWIDTH] IN BLOOD BY AUTOMATED COUNT: 12.9 % (ref 11–15)
GLUCOSE SERPL-MCNC: 91 MG/DL (ref 65–99)
HCT VFR BLD AUTO: 43.5 % (ref 35–45)
HCV AB S/CO SERPL IA: 0.02
HCV AB SERPL QL IA: NORMAL
HDLC SERPL-MCNC: 76 MG/DL
HGB BLD-MCNC: 13.9 G/DL (ref 11.7–15.5)
LDLC SERPL CALC-MCNC: 160 MG/DL (CALC)
LYMPHOCYTES # BLD AUTO: 1268 CELLS/UL (ref 850–3900)
LYMPHOCYTES NFR BLD AUTO: 37.3 %
MCH RBC QN AUTO: 28.8 PG (ref 27–33)
MCHC RBC AUTO-ENTMCNC: 32 G/DL (ref 32–36)
MCV RBC AUTO: 90.2 FL (ref 80–100)
MONOCYTES # BLD AUTO: 303 CELLS/UL (ref 200–950)
MONOCYTES NFR BLD AUTO: 8.9 %
NEUTROPHILS # BLD AUTO: 1676 CELLS/UL (ref 1500–7800)
NEUTROPHILS NFR BLD AUTO: 49.3 %
NONHDLC SERPL-MCNC: 178 MG/DL (CALC)
PLATELET # BLD AUTO: 166 THOUSAND/UL (ref 140–400)
PMV BLD REES-ECKER: 12.3 FL (ref 7.5–12.5)
POTASSIUM SERPL-SCNC: 4.5 MMOL/L (ref 3.5–5.3)
RBC # BLD AUTO: 4.82 MILLION/UL (ref 3.8–5.1)
SL AMB EGFR AFRICAN AMERICAN: 86 ML/MIN/1.73M2
SL AMB EGFR NON AFRICAN AMERICAN: 74 ML/MIN/1.73M2
SODIUM SERPL-SCNC: 138 MMOL/L (ref 135–146)
TRIGL SERPL-MCNC: 77 MG/DL
WBC # BLD AUTO: 3.4 THOUSAND/UL (ref 3.8–10.8)

## 2019-10-01 NOTE — TELEPHONE ENCOUNTER
BW is back    Results overall look fine except for cholesterol which has gone up to 254    Recommend Nathalie meet with a nutritionist to discuss how to lower cholesterol further OR we can do something called a 'calcium score' which is a CAT scan of coronary arteries to see if there signs of blockages which would necessitate taking a statin(chol lowering medication)

## 2019-10-02 NOTE — TELEPHONE ENCOUNTER
SPOKE WITH PT, GAVE RESULTS/MESSAGE  SHE STATED THAT SHE'S GOING TO CHANGE HER DIET ON HER OWN, "CUT OUT THE HARD BOILED EGGS SHE EATS, AND TAKE FISH OIL", FOR SEVERAL MONTHS AND THEN WANTS TO REPEAT HER LABS TO SEE IF THAT MAKES A DIFFERENCE BEFORE SHE DOES EITHER OF THE THINGS YOU SUGGESTED

## 2019-11-25 DIAGNOSIS — F41.9 ANXIETY: ICD-10-CM

## 2019-11-25 RX ORDER — LORAZEPAM 0.5 MG/1
0.5 TABLET ORAL DAILY PRN
Qty: 20 TABLET | Refills: 0 | Status: SHIPPED | OUTPATIENT
Start: 2019-11-25 | End: 2020-03-23 | Stop reason: SDUPTHER

## 2019-11-25 NOTE — TELEPHONE ENCOUNTER
Please message care gap to obtain pap smear results    Pt sees [facility](in Care Everywhere):    Momo Salinas MD    [facility] [address] [place], [address]

## 2019-12-06 DIAGNOSIS — G43.909 MIGRAINE WITHOUT STATUS MIGRAINOSUS, NOT INTRACTABLE, UNSPECIFIED MIGRAINE TYPE: ICD-10-CM

## 2019-12-06 RX ORDER — RIZATRIPTAN BENZOATE 10 MG/1
TABLET, ORALLY DISINTEGRATING ORAL
Qty: 27 TABLET | Refills: 1 | Status: SHIPPED | OUTPATIENT
Start: 2019-12-06 | End: 2021-03-04

## 2020-02-01 DIAGNOSIS — F41.9 ANXIETY: ICD-10-CM

## 2020-03-23 DIAGNOSIS — F41.9 ANXIETY: ICD-10-CM

## 2020-03-23 RX ORDER — LORAZEPAM 0.5 MG/1
0.5 TABLET ORAL DAILY PRN
Qty: 20 TABLET | Refills: 0 | Status: SHIPPED | OUTPATIENT
Start: 2020-03-23 | End: 2020-07-21 | Stop reason: SDUPTHER

## 2020-05-03 DIAGNOSIS — F41.9 ANXIETY: ICD-10-CM

## 2020-05-22 ENCOUNTER — TELEMEDICINE (OUTPATIENT)
Dept: INTERNAL MEDICINE CLINIC | Facility: CLINIC | Age: 60
End: 2020-05-22
Payer: COMMERCIAL

## 2020-05-22 DIAGNOSIS — F41.9 ANXIETY: ICD-10-CM

## 2020-05-22 DIAGNOSIS — E78.5 DYSLIPIDEMIA: Primary | ICD-10-CM

## 2020-05-22 DIAGNOSIS — G43.009 MIGRAINE WITHOUT AURA AND WITHOUT STATUS MIGRAINOSUS, NOT INTRACTABLE: ICD-10-CM

## 2020-05-22 DIAGNOSIS — J30.2 SEASONAL ALLERGIC RHINITIS, UNSPECIFIED TRIGGER: ICD-10-CM

## 2020-05-22 DIAGNOSIS — K21.00 GASTROESOPHAGEAL REFLUX DISEASE WITH ESOPHAGITIS: ICD-10-CM

## 2020-05-22 DIAGNOSIS — R79.89 LOW VITAMIN D LEVEL: ICD-10-CM

## 2020-05-22 PROCEDURE — 99214 OFFICE O/P EST MOD 30 MIN: CPT | Performed by: INTERNAL MEDICINE

## 2020-07-21 DIAGNOSIS — F41.9 ANXIETY: ICD-10-CM

## 2020-07-21 RX ORDER — LORAZEPAM 0.5 MG/1
0.5 TABLET ORAL DAILY PRN
Qty: 20 TABLET | Refills: 0 | Status: SHIPPED | OUTPATIENT
Start: 2020-07-21 | End: 2020-12-03 | Stop reason: SDUPTHER

## 2020-10-26 ENCOUNTER — OFFICE VISIT (OUTPATIENT)
Dept: INTERNAL MEDICINE CLINIC | Facility: CLINIC | Age: 60
End: 2020-10-26
Payer: COMMERCIAL

## 2020-10-26 VITALS
TEMPERATURE: 98.4 F | RESPIRATION RATE: 18 BRPM | HEIGHT: 65 IN | WEIGHT: 192.8 LBS | BODY MASS INDEX: 32.12 KG/M2 | DIASTOLIC BLOOD PRESSURE: 82 MMHG | HEART RATE: 92 BPM | OXYGEN SATURATION: 97 % | SYSTOLIC BLOOD PRESSURE: 138 MMHG

## 2020-10-26 DIAGNOSIS — F41.9 ANXIETY: ICD-10-CM

## 2020-10-26 DIAGNOSIS — Z23 FLU VACCINE NEED: ICD-10-CM

## 2020-10-26 DIAGNOSIS — R79.89 LOW VITAMIN D LEVEL: ICD-10-CM

## 2020-10-26 DIAGNOSIS — E78.5 DYSLIPIDEMIA: ICD-10-CM

## 2020-10-26 DIAGNOSIS — N30.90 BLADDER INFECTION: Primary | ICD-10-CM

## 2020-10-26 DIAGNOSIS — K21.00 GASTROESOPHAGEAL REFLUX DISEASE WITH ESOPHAGITIS WITHOUT HEMORRHAGE: ICD-10-CM

## 2020-10-26 LAB
BACTERIA UR QL AUTO: ABNORMAL /HPF
BILIRUB UR QL STRIP: NEGATIVE
CLARITY UR: ABNORMAL
COLOR UR: YELLOW
GLUCOSE UR STRIP-MCNC: NEGATIVE MG/DL
HGB UR QL STRIP.AUTO: NEGATIVE
HYALINE CASTS #/AREA URNS LPF: ABNORMAL /LPF
KETONES UR STRIP-MCNC: NEGATIVE MG/DL
LEUKOCYTE ESTERASE UR QL STRIP: ABNORMAL
NITRITE UR QL STRIP: NEGATIVE
NON-SQ EPI CELLS URNS QL MICRO: ABNORMAL /HPF
PH UR STRIP.AUTO: 6.5 [PH]
PROT UR STRIP-MCNC: NEGATIVE MG/DL
RBC #/AREA URNS AUTO: ABNORMAL /HPF
SL AMB  POCT GLUCOSE, UA: ABNORMAL
SL AMB LEUKOCYTE ESTERASE,UA: 70
SL AMB POCT BILIRUBIN,UA: ABNORMAL
SL AMB POCT BLOOD,UA: ABNORMAL
SL AMB POCT CLARITY,UA: ABNORMAL
SL AMB POCT COLOR,UA: YELLOW
SL AMB POCT KETONES,UA: ABNORMAL
SL AMB POCT NITRITE,UA: ABNORMAL
SL AMB POCT PH,UA: 6
SL AMB POCT SPECIFIC GRAVITY,UA: 1.02
SL AMB POCT URINE PROTEIN: 15
SL AMB POCT UROBILINOGEN: 0.2
SP GR UR STRIP.AUTO: 1.02 (ref 1–1.03)
UROBILINOGEN UR QL STRIP.AUTO: 0.2 E.U./DL
WBC #/AREA URNS AUTO: ABNORMAL /HPF

## 2020-10-26 PROCEDURE — 87077 CULTURE AEROBIC IDENTIFY: CPT | Performed by: INTERNAL MEDICINE

## 2020-10-26 PROCEDURE — 87086 URINE CULTURE/COLONY COUNT: CPT | Performed by: INTERNAL MEDICINE

## 2020-10-26 PROCEDURE — 81001 URINALYSIS AUTO W/SCOPE: CPT | Performed by: INTERNAL MEDICINE

## 2020-10-26 PROCEDURE — 90682 RIV4 VACC RECOMBINANT DNA IM: CPT

## 2020-10-26 PROCEDURE — 87186 SC STD MICRODIL/AGAR DIL: CPT | Performed by: INTERNAL MEDICINE

## 2020-10-26 PROCEDURE — 90471 IMMUNIZATION ADMIN: CPT

## 2020-10-26 PROCEDURE — 3008F BODY MASS INDEX DOCD: CPT | Performed by: INTERNAL MEDICINE

## 2020-10-26 PROCEDURE — 99214 OFFICE O/P EST MOD 30 MIN: CPT | Performed by: INTERNAL MEDICINE

## 2020-10-26 PROCEDURE — 81002 URINALYSIS NONAUTO W/O SCOPE: CPT | Performed by: INTERNAL MEDICINE

## 2020-10-26 RX ORDER — NITROFURANTOIN 25; 75 MG/1; MG/1
100 CAPSULE ORAL 2 TIMES DAILY
Qty: 14 CAPSULE | Refills: 0 | Status: SHIPPED | OUTPATIENT
Start: 2020-10-26 | End: 2020-11-02

## 2020-10-26 RX ORDER — CEFUROXIME AXETIL 250 MG/1
250 TABLET ORAL EVERY 12 HOURS SCHEDULED
Qty: 20 TABLET | Refills: 0 | Status: CANCELLED | OUTPATIENT
Start: 2020-10-26 | End: 2020-11-05

## 2020-10-27 ENCOUNTER — TELEPHONE (OUTPATIENT)
Dept: ADMINISTRATIVE | Facility: OTHER | Age: 60
End: 2020-10-27

## 2020-10-29 DIAGNOSIS — F41.9 ANXIETY: ICD-10-CM

## 2020-10-29 LAB — BACTERIA UR CULT: ABNORMAL

## 2020-12-03 DIAGNOSIS — F41.9 ANXIETY: ICD-10-CM

## 2020-12-03 RX ORDER — LORAZEPAM 0.5 MG/1
0.5 TABLET ORAL DAILY PRN
Qty: 20 TABLET | Refills: 0 | Status: SHIPPED | OUTPATIENT
Start: 2020-12-03

## 2021-03-04 DIAGNOSIS — G43.909 MIGRAINE WITHOUT STATUS MIGRAINOSUS, NOT INTRACTABLE, UNSPECIFIED MIGRAINE TYPE: ICD-10-CM

## 2021-03-04 RX ORDER — RIZATRIPTAN BENZOATE 10 MG/1
TABLET, ORALLY DISINTEGRATING ORAL
Qty: 27 TABLET | Refills: 1 | Status: SHIPPED | OUTPATIENT
Start: 2021-03-04 | End: 2022-03-28

## 2021-03-30 DIAGNOSIS — Z23 ENCOUNTER FOR IMMUNIZATION: ICD-10-CM

## 2021-04-27 DIAGNOSIS — F41.9 ANXIETY: ICD-10-CM

## 2021-05-17 ENCOUNTER — RA CDI HCC (OUTPATIENT)
Dept: OTHER | Facility: HOSPITAL | Age: 61
End: 2021-05-17

## 2021-05-17 NOTE — PROGRESS NOTES
[facility]  coding opportunities          Chart reviewed, no opportunity found: CHART REVIEWED, NO OPPORTUNITY FOUND              Patients insurance company:  [employer] [facility] (Medicare Advantage and Commercial)

## 2021-06-14 ENCOUNTER — RA CDI HCC (OUTPATIENT)
Dept: OTHER | Facility: HOSPITAL | Age: 61
End: 2021-06-14

## 2021-06-14 NOTE — PROGRESS NOTES
[facility]  coding opportunities          Chart reviewed, no opportunity found: CHART REVIEWED, NO OPPORTUNITY FOUND                     Patients insurance company: [employer] (Medicare Advantage and Commercial)

## 2021-06-21 ENCOUNTER — OFFICE VISIT (OUTPATIENT)
Dept: INTERNAL MEDICINE CLINIC | Facility: CLINIC | Age: 61
End: 2021-06-21
Payer: COMMERCIAL

## 2021-06-21 VITALS
WEIGHT: 190.2 LBS | OXYGEN SATURATION: 96 % | HEART RATE: 68 BPM | TEMPERATURE: 98.1 F | HEIGHT: 65 IN | BODY MASS INDEX: 31.69 KG/M2 | DIASTOLIC BLOOD PRESSURE: 78 MMHG | RESPIRATION RATE: 16 BRPM | SYSTOLIC BLOOD PRESSURE: 130 MMHG

## 2021-06-21 DIAGNOSIS — J30.2 SEASONAL ALLERGIC RHINITIS, UNSPECIFIED TRIGGER: ICD-10-CM

## 2021-06-21 DIAGNOSIS — E78.5 DYSLIPIDEMIA: ICD-10-CM

## 2021-06-21 DIAGNOSIS — G43.009 MIGRAINE WITHOUT AURA AND WITHOUT STATUS MIGRAINOSUS, NOT INTRACTABLE: ICD-10-CM

## 2021-06-21 DIAGNOSIS — F41.9 ANXIETY: ICD-10-CM

## 2021-06-21 DIAGNOSIS — Z00.00 WELLNESS EXAMINATION: Primary | ICD-10-CM

## 2021-06-21 DIAGNOSIS — K21.00 GASTROESOPHAGEAL REFLUX DISEASE WITH ESOPHAGITIS WITHOUT HEMORRHAGE: ICD-10-CM

## 2021-06-21 PROCEDURE — 3008F BODY MASS INDEX DOCD: CPT | Performed by: INTERNAL MEDICINE

## 2021-06-21 PROCEDURE — 3725F SCREEN DEPRESSION PERFORMED: CPT | Performed by: INTERNAL MEDICINE

## 2021-06-21 PROCEDURE — 1036F TOBACCO NON-USER: CPT | Performed by: INTERNAL MEDICINE

## 2021-06-21 PROCEDURE — 99396 PREV VISIT EST AGE 40-64: CPT | Performed by: INTERNAL MEDICINE

## 2021-06-21 NOTE — PROGRESS NOTES
Assessment/Plan:     Diagnoses and all orders for this visit:    Wellness examination    Gastroesophageal reflux disease with esophagitis without hemorrhage  Comments:  taking OTC nexium and symptoms controlled  H2 blocker in-effective in treating symptoms in past  Orders:  -     Comprehensive metabolic panel; Future  -     CBC and differential; Future    Seasonal allergic rhinitis, unspecified trigger  Comments:  stable, c/w montelukast  Orders:  -     Comprehensive metabolic panel; Future  -     CBC and differential; Future    Migraine without aura and without status migrainosus, not intractable  Comments:  not having much migraines lately, since entering menopause  c/w maxalt prn  Orders:  -     Comprehensive metabolic panel; Future  -     CBC and differential; Future    Anxiety  Comments:  doing well with SSRI, c/w rx and at current dose    Dyslipidemia  Comments:  declines rx/statin in past   working on diet & lifestyle changes  re-check lipid panel in 2 mos  Orders:  -     Lipid Panel with Direct LDL reflex; Future      BMI Counseling: Body mass index is 31 65 kg/m²  The BMI is above normal  Exercise recommendations include exercising 3-5 times per week  Subjective:      Patient ID: Tona Menard is a 64 y o  female  HPI    Here for physical, Nathalie is overall stable  She is taking her medications as prescribed  She lost a lot of family and friends over the last 1-2 yrs but has been feeling better from greiving  She is exercising again with walking regularly  She has no complaints with her allergies and her migraines have improved since she has entered menopause  Stress levels have been manageable  She is UTD on COVID-19 vaccine  She would rather work on lowering cholesterol with lifestyle changes vs taking a medication for it  She is UTD On colonoscopy, due for next in 2022  She is scheduled for mammogram in fall 2021(outside [facility])    ROS Otherwise negative, no other complaints  Past Medical History:   Diagnosis Date    History of oral aphthous ulcers     last assessed 04/01/2013    Other specified menopausal and perimenopausal disorders (CODE)     last assessed 10/25/2013    Snoring     last assessed 08/29/2012     Vitals:    06/21/21 1115   BP: 130/78   Pulse: 68   Resp: 16   Temp: 98 1 °F (36 7 °C)   SpO2: 96%   Weight: 86 3 kg (190 lb 3 2 oz)   Height: 5' 5" (1 651 m)     Body mass index is 31 65 kg/m²  Current Outpatient Medications:     esomeprazole (NexIUM) 20 mg capsule, Take 20 mg by mouth every morning before breakfast, Disp: , Rfl:     LORazepam (ATIVAN) 0 5 mg tablet, Take 1 tablet (0 5 mg total) by mouth daily as needed for anxiety, Disp: 20 tablet, Rfl: 0    montelukast (SINGULAIR) 10 mg tablet, Take 1 tablet (10 mg total) by mouth daily at bedtime, Disp: 90 tablet, Rfl: 1    rizatriptan (MAXALT-MLT) 10 MG disintegrating tablet, PLACE 1 TABLET ON TONGUE AT ONSET OF MIGRAINE, MAY REPEAT IN 2 HOURS IF NEEDED  MAXIMUM OF 2 TABLETS PER DAY, Disp: 27 tablet, Rfl: 1    sertraline (ZOLOFT) 50 mg tablet, TAKE 1 TABLET DAILY AT BEDTIME, Disp: 90 tablet, Rfl: 1  No Known Allergies      Review of Systems   Constitutional: Negative for fever  HENT: Negative for congestion  Eyes: Negative for visual disturbance  Respiratory: Negative for shortness of breath  Cardiovascular: Negative for chest pain  Gastrointestinal: Negative for abdominal pain  Endocrine: Negative for polyuria  Genitourinary: Negative for difficulty urinating  Musculoskeletal: Negative for arthralgias  Skin: Negative for rash  Allergic/Immunologic: Positive for environmental allergies  Neurological: Negative for headaches  Psychiatric/Behavioral: The patient is not nervous/anxious            Objective:      /78   Pulse 68   Temp 98 1 °F (36 7 °C)   Resp 16   Ht 5' 5" (1 651 m)   Wt 86 3 kg (190 lb 3 2 oz)   SpO2 96%   BMI 31 65 kg/m²          Physical Exam  Vitals reviewed  Constitutional:       Appearance: Normal appearance  HENT:      Head: Normocephalic and atraumatic  Right Ear: Tympanic membrane normal       Left Ear: Tympanic membrane normal    Cardiovascular:      Rate and Rhythm: Normal rate and regular rhythm  Heart sounds: No murmur heard  Pulmonary:      Effort: Pulmonary effort is normal       Breath sounds: No wheezing or rales  Abdominal:      General: Bowel sounds are normal       Palpations: Abdomen is soft  Tenderness: There is no abdominal tenderness  Musculoskeletal:      Right lower leg: No edema  Left lower leg: No edema  Neurological:      Mental Status: She is alert  Mental status is at baseline     Psychiatric:         Mood and Affect: Mood normal          Behavior: Behavior normal

## 2021-07-22 ENCOUNTER — TELEPHONE (OUTPATIENT)
Dept: GASTROENTEROLOGY | Facility: CLINIC | Age: 61
End: 2021-07-22

## 2021-07-22 NOTE — TELEPHONE ENCOUNTER
Patient was due for f/u ov to colon last year with Dr Jennifer Borges  I called and spoke with patient and she isn't having any issues right now that she needs to see him  She will call if needed otherwise she will see Dr Jennifer Borges next December for her colonoscopy

## 2021-08-25 LAB
ALBUMIN SERPL-MCNC: 4.3 G/DL (ref 3.6–5.1)
ALBUMIN/GLOB SERPL: 2 (CALC) (ref 1–2.5)
ALP SERPL-CCNC: 86 U/L (ref 37–153)
ALT SERPL-CCNC: 15 U/L (ref 6–29)
AST SERPL-CCNC: 15 U/L (ref 10–35)
BASOPHILS # BLD AUTO: 39 CELLS/UL (ref 0–200)
BASOPHILS NFR BLD AUTO: 1.1 %
BILIRUB SERPL-MCNC: 0.5 MG/DL (ref 0.2–1.2)
BUN SERPL-MCNC: 15 MG/DL (ref 7–25)
BUN/CREAT SERPL: NORMAL (CALC) (ref 6–22)
CALCIUM SERPL-MCNC: 9.3 MG/DL (ref 8.6–10.4)
CHLORIDE SERPL-SCNC: 105 MMOL/L (ref 98–110)
CHOLEST SERPL-MCNC: 241 MG/DL
CHOLEST/HDLC SERPL: 3.2 (CALC)
CO2 SERPL-SCNC: 28 MMOL/L (ref 20–32)
CREAT SERPL-MCNC: 0.82 MG/DL (ref 0.5–1.05)
EOSINOPHIL # BLD AUTO: 119 CELLS/UL (ref 15–500)
EOSINOPHIL NFR BLD AUTO: 3.4 %
ERYTHROCYTE [DISTWIDTH] IN BLOOD BY AUTOMATED COUNT: 13.2 % (ref 11–15)
GLOBULIN SER CALC-MCNC: 2.2 G/DL (CALC) (ref 1.9–3.7)
GLUCOSE SERPL-MCNC: 98 MG/DL (ref 65–99)
HCT VFR BLD AUTO: 41.1 % (ref 35–45)
HDLC SERPL-MCNC: 75 MG/DL
HGB BLD-MCNC: 13.5 G/DL (ref 11.7–15.5)
LDLC SERPL CALC-MCNC: 145 MG/DL (CALC)
LYMPHOCYTES # BLD AUTO: 1302 CELLS/UL (ref 850–3900)
LYMPHOCYTES NFR BLD AUTO: 37.2 %
MCH RBC QN AUTO: 30.5 PG (ref 27–33)
MCHC RBC AUTO-ENTMCNC: 32.8 G/DL (ref 32–36)
MCV RBC AUTO: 93 FL (ref 80–100)
MONOCYTES # BLD AUTO: 249 CELLS/UL (ref 200–950)
MONOCYTES NFR BLD AUTO: 7.1 %
NEUTROPHILS # BLD AUTO: 1792 CELLS/UL (ref 1500–7800)
NEUTROPHILS NFR BLD AUTO: 51.2 %
NONHDLC SERPL-MCNC: 166 MG/DL (CALC)
PLATELET # BLD AUTO: 175 THOUSAND/UL (ref 140–400)
PMV BLD REES-ECKER: 12.5 FL (ref 7.5–12.5)
POTASSIUM SERPL-SCNC: 4.5 MMOL/L (ref 3.5–5.3)
PROT SERPL-MCNC: 6.5 G/DL (ref 6.1–8.1)
RBC # BLD AUTO: 4.42 MILLION/UL (ref 3.8–5.1)
SL AMB EGFR AFRICAN AMERICAN: 91 ML/MIN/1.73M2
SL AMB EGFR NON AFRICAN AMERICAN: 79 ML/MIN/1.73M2
SODIUM SERPL-SCNC: 141 MMOL/L (ref 135–146)
TRIGL SERPL-MCNC: 98 MG/DL
WBC # BLD AUTO: 3.5 THOUSAND/UL (ref 3.8–10.8)

## 2021-08-31 ENCOUNTER — TELEPHONE (OUTPATIENT)
Dept: INTERNAL MEDICINE CLINIC | Facility: CLINIC | Age: 61
End: 2021-08-31

## 2021-08-31 NOTE — TELEPHONE ENCOUNTER
----- Message from Liza Moore DO sent at 8/30/2021  5:58 PM EDT -----  BW is back  Cholesterol has improved and blood work otherwise looks good/remains stable    Let me know if questions

## 2021-08-31 NOTE — TELEPHONE ENCOUNTER
Called and left message on patient's voicemail about blood work results told her to call back if she had any questions

## 2021-09-22 ENCOUNTER — TELEPHONE (OUTPATIENT)
Dept: ADMINISTRATIVE | Facility: OTHER | Age: 61
End: 2021-09-22

## 2021-09-22 NOTE — TELEPHONE ENCOUNTER
Upon review of the In Basket request we were able to locate, review, and update the patient chart as requested for Mammogram     Any additional questions or concerns should be emailed to the Practice Liaisons via [email or web address] email, please do not reply via In Basket      Thank you  Juanito Philippe

## 2021-09-22 NOTE — TELEPHONE ENCOUNTER
----- Message from Cesia Mary sent at 9/21/2021 11:44 AM EDT -----  09/21/21 11:46 AM    Hello, our patient Nish Mcallister has had Mammogramcompleted/performed  Please assist in updating the patient chart by pulling the Care Everywhere (CE) document  The date of service is 2021       Thank you,  Cesia Mary  [facility] INTERNAL MED

## 2021-11-02 ENCOUNTER — OFFICE VISIT (OUTPATIENT)
Dept: INTERNAL MEDICINE CLINIC | Facility: CLINIC | Age: 61
End: 2021-11-02
Payer: COMMERCIAL

## 2021-11-02 VITALS
WEIGHT: 194 LBS | OXYGEN SATURATION: 98 % | TEMPERATURE: 98.2 F | SYSTOLIC BLOOD PRESSURE: 128 MMHG | RESPIRATION RATE: 16 BRPM | HEIGHT: 65 IN | DIASTOLIC BLOOD PRESSURE: 78 MMHG | HEART RATE: 68 BPM | BODY MASS INDEX: 32.32 KG/M2

## 2021-11-02 DIAGNOSIS — G43.009 MIGRAINE WITHOUT AURA AND WITHOUT STATUS MIGRAINOSUS, NOT INTRACTABLE: ICD-10-CM

## 2021-11-02 DIAGNOSIS — Z23 FLU VACCINE NEED: ICD-10-CM

## 2021-11-02 DIAGNOSIS — J30.2 SEASONAL ALLERGIC RHINITIS, UNSPECIFIED TRIGGER: ICD-10-CM

## 2021-11-02 DIAGNOSIS — F41.9 ANXIETY: ICD-10-CM

## 2021-11-02 DIAGNOSIS — K21.00 GASTROESOPHAGEAL REFLUX DISEASE WITH ESOPHAGITIS WITHOUT HEMORRHAGE: Primary | ICD-10-CM

## 2021-11-02 DIAGNOSIS — R79.89 LOW VITAMIN D LEVEL: ICD-10-CM

## 2021-11-02 PROBLEM — Z51.6 ALLERGY DESENSITIZATION THERAPY: Status: RESOLVED | Noted: 2019-09-12 | Resolved: 2021-11-02

## 2021-11-02 PROCEDURE — 90682 RIV4 VACC RECOMBINANT DNA IM: CPT

## 2021-11-02 PROCEDURE — 90471 IMMUNIZATION ADMIN: CPT

## 2021-11-02 PROCEDURE — 99214 OFFICE O/P EST MOD 30 MIN: CPT | Performed by: INTERNAL MEDICINE

## 2021-11-23 ENCOUNTER — OFFICE VISIT (OUTPATIENT)
Dept: GASTROENTEROLOGY | Facility: CLINIC | Age: 61
End: 2021-11-23
Payer: COMMERCIAL

## 2021-11-23 VITALS
SYSTOLIC BLOOD PRESSURE: 136 MMHG | HEART RATE: 78 BPM | DIASTOLIC BLOOD PRESSURE: 77 MMHG | BODY MASS INDEX: 32.15 KG/M2 | WEIGHT: 193 LBS | HEIGHT: 65 IN

## 2021-11-23 DIAGNOSIS — Z80.0 FAMILY HISTORY OF COLON CANCER IN MOTHER: ICD-10-CM

## 2021-11-23 DIAGNOSIS — R19.7 DIARRHEA, UNSPECIFIED TYPE: Primary | ICD-10-CM

## 2021-11-23 DIAGNOSIS — D12.6 ADENOMATOUS POLYP OF COLON, UNSPECIFIED PART OF COLON: ICD-10-CM

## 2021-11-23 PROCEDURE — 1036F TOBACCO NON-USER: CPT | Performed by: INTERNAL MEDICINE

## 2021-11-23 PROCEDURE — 99214 OFFICE O/P EST MOD 30 MIN: CPT | Performed by: INTERNAL MEDICINE

## 2021-11-23 PROCEDURE — 3008F BODY MASS INDEX DOCD: CPT | Performed by: INTERNAL MEDICINE

## 2022-01-18 ENCOUNTER — HOSPITAL ENCOUNTER (OUTPATIENT)
Dept: RADIOLOGY | Facility: HOSPITAL | Age: 62
Discharge: HOME | End: 2022-01-18
Payer: COMMERCIAL

## 2022-01-18 ENCOUNTER — OFFICE VISIT (OUTPATIENT)
Dept: INTERNAL MEDICINE CLINIC | Facility: CLINIC | Age: 62
End: 2022-01-18
Payer: COMMERCIAL

## 2022-01-18 VITALS
SYSTOLIC BLOOD PRESSURE: 128 MMHG | RESPIRATION RATE: 16 BRPM | WEIGHT: 191.6 LBS | HEIGHT: 65 IN | BODY MASS INDEX: 31.92 KG/M2 | HEART RATE: 104 BPM | DIASTOLIC BLOOD PRESSURE: 80 MMHG | TEMPERATURE: 98.5 F | OXYGEN SATURATION: 97 %

## 2022-01-18 DIAGNOSIS — R05.3 PERSISTENT COUGH: ICD-10-CM

## 2022-01-18 DIAGNOSIS — R05.8 POST-VIRAL COUGH SYNDROME: Primary | ICD-10-CM

## 2022-01-18 PROCEDURE — 71046 X-RAY EXAM CHEST 2 VIEWS: CPT

## 2022-01-18 PROCEDURE — 3725F SCREEN DEPRESSION PERFORMED: CPT | Performed by: INTERNAL MEDICINE

## 2022-01-18 PROCEDURE — 99214 OFFICE O/P EST MOD 30 MIN: CPT | Performed by: INTERNAL MEDICINE

## 2022-01-18 PROCEDURE — 1036F TOBACCO NON-USER: CPT | Performed by: INTERNAL MEDICINE

## 2022-01-18 PROCEDURE — 3008F BODY MASS INDEX DOCD: CPT | Performed by: INTERNAL MEDICINE

## 2022-01-18 RX ORDER — CODEINE PHOSPHATE AND GUAIFENESIN 10; 100 MG/5ML; MG/5ML
SOLUTION ORAL
COMMUNITY
Start: 2021-12-07 | End: 2022-03-21

## 2022-01-18 RX ORDER — DEXAMETHASONE 4 MG/1
1-2 TABLET ORAL 2 TIMES DAILY
Qty: 12 G | Refills: 0 | Status: SHIPPED | OUTPATIENT
Start: 2022-01-18 | End: 2022-05-24

## 2022-01-18 RX ORDER — BENZONATATE 200 MG/1
CAPSULE ORAL
COMMUNITY
Start: 2021-12-04 | End: 2022-03-21

## 2022-01-18 NOTE — PATIENT INSTRUCTIONS
1  Chest x-ray today  2  If x-ray shows clear lungs, recommend flovent inhaler twice a day  3  Continue cough suppressants as needed  4   Call if not improving as expected

## 2022-01-18 NOTE — PROGRESS NOTES
Assessment/Plan:     Diagnoses and all orders for this visit:    Post-viral cough syndrome  Comments:  suspect post viral cough syndrome  check CXR now, if normal -> begin flovent BID   c/w cough remedies/suppressants and add 2nd gen AH  Orders:  -     fluticasone (Flovent HFA) 110 MCG/ACT inhaler; Inhale 1-2 puffs 2 (two) times a day Rinse mouth after use  Persistent cough  Comments:  as above, if symptoms do not improve -> patient will call(she is also going to test herself at home for COVID again tmrw)  Orders:  -     Cancel: XR chest pa & lateral; Future  -     XR chest pa & lateral; Future    Other orders  -     guaiFENesin-codeine (ROBITUSSIN AC) 100-10 mg/5 mL oral solution  -     benzonatate (TESSALON) 200 MG capsule          Subjective:      Patient ID: Debera Harada is a 63 y o  female  She had a URI in early December, tested herself for COVID at home(rapid test) 3 times that week and all were negative  She went to [facility] and was treated with oral steroids and cough suppressant and a 2nd time was treated with codeine cough syrup  She felt better from URI but has cont'd to cough  Her cough got a bit worse yesterday, mostly dry and no phlegm production, fever, chills, body aches, sweats, SOB, chest pains or wheezing  She has a hx of seasonal allergies and used an albuterol MDI In the past   No recent sick contacts, spent time with a relative around [place] who had COVID(3 wks ago)  UTD on COVID booster dose and flu vaccine  Having some mild nasal congestion as well  ROS Otherwise negative, no other complaints  As per patient (filled out on mychart):    Cough  The current episode started more than 1 month ago  The problem has been waxing and waning  The problem occurs every few minutes  The cough is non-productive  Associated symptoms include postnasal drip (mild)  Pertinent negatives include no chest pain, chills, fever, myalgias, rash, sore throat or shortness of breath   Nothing aggravates the symptoms  Her past medical history is significant for environmental allergies  Past Medical History:   Diagnosis Date    History of oral aphthous ulcers     last assessed 04/01/2013    Other specified menopausal and perimenopausal disorders (CODE)     last assessed 10/25/2013    Snoring     last assessed 08/29/2012     Vitals:    01/18/22 1529   BP: 128/80   Pulse: 104   Resp: 16   Temp: 98 5 °F (36 9 °C)   SpO2: 97%   Weight: 86 9 kg (191 lb 9 6 oz)   Height: 5' 5" (1 651 m)     Body mass index is 31 88 kg/m²  Current Outpatient Medications:     esomeprazole (NexIUM) 20 mg capsule, Take 20 mg by mouth every morning before breakfast, Disp: , Rfl:     LORazepam (ATIVAN) 0 5 mg tablet, Take 1 tablet (0 5 mg total) by mouth daily as needed for anxiety, Disp: 20 tablet, Rfl: 0    montelukast (SINGULAIR) 10 mg tablet, Take 1 tablet (10 mg total) by mouth daily at bedtime, Disp: 90 tablet, Rfl: 1    rizatriptan (MAXALT-MLT) 10 MG disintegrating tablet, PLACE 1 TABLET ON TONGUE AT ONSET OF MIGRAINE, MAY REPEAT IN 2 HOURS IF NEEDED  MAXIMUM OF 2 TABLETS PER DAY, Disp: 27 tablet, Rfl: 1    sertraline (ZOLOFT) 50 mg tablet, TAKE 1 TABLET DAILY AT BEDTIME, Disp: 90 tablet, Rfl: 1    benzonatate (TESSALON) 200 MG capsule, , Disp: , Rfl:     fluticasone (Flovent HFA) 110 MCG/ACT inhaler, Inhale 1-2 puffs 2 (two) times a day Rinse mouth after use , Disp: 12 g, Rfl: 0    guaiFENesin-codeine (ROBITUSSIN AC) 100-10 mg/5 mL oral solution, , Disp: , Rfl:   No Known Allergies      Review of Systems   Constitutional: Negative for chills and fever  HENT: Positive for postnasal drip (mild)  Negative for sore throat  Eyes: Negative for visual disturbance  Respiratory: Positive for cough  Negative for shortness of breath  Cardiovascular: Negative for chest pain  Gastrointestinal: Negative for abdominal pain and diarrhea  Endocrine: Negative for polyuria     Genitourinary: Negative for difficulty urinating  Musculoskeletal: Negative for arthralgias and myalgias  Skin: Negative for rash  Allergic/Immunologic: Positive for environmental allergies  Negative for immunocompromised state  Neurological: Negative for dizziness  Psychiatric/Behavioral: Negative for dysphoric mood  Objective:      /80   Pulse 104   Temp 98 5 °F (36 9 °C)   Resp 16   Ht 5' 5" (1 651 m)   Wt 86 9 kg (191 lb 9 6 oz)   SpO2 97%   BMI 31 88 kg/m²          Physical Exam  Vitals reviewed  Constitutional:       Appearance: Normal appearance  Comments: Coughing during the appt(dry cough)   HENT:      Head: Normocephalic and atraumatic  Right Ear: Tympanic membrane normal       Left Ear: Tympanic membrane normal       Nose: Congestion present  No rhinorrhea  Mouth/Throat:      Pharynx: No oropharyngeal exudate or posterior oropharyngeal erythema  Eyes:      Conjunctiva/sclera: Conjunctivae normal    Cardiovascular:      Rate and Rhythm: Normal rate and regular rhythm  Heart sounds: No murmur heard  Pulmonary:      Effort: Pulmonary effort is normal       Breath sounds: No wheezing or rales  Abdominal:      General: Bowel sounds are normal       Palpations: Abdomen is soft  Tenderness: There is no abdominal tenderness  Musculoskeletal:      Right lower leg: No edema  Left lower leg: No edema  Lymphadenopathy:      Cervical: No cervical adenopathy  Neurological:      Mental Status: She is alert  Mental status is at baseline     Psychiatric:         Mood and Affect: Mood normal          Behavior: Behavior normal

## 2022-03-21 ENCOUNTER — OFFICE VISIT (OUTPATIENT)
Dept: INTERNAL MEDICINE CLINIC | Facility: CLINIC | Age: 62
End: 2022-03-21
Payer: COMMERCIAL

## 2022-03-21 VITALS
BODY MASS INDEX: 32.82 KG/M2 | SYSTOLIC BLOOD PRESSURE: 124 MMHG | OXYGEN SATURATION: 97 % | HEIGHT: 65 IN | DIASTOLIC BLOOD PRESSURE: 78 MMHG | RESPIRATION RATE: 16 BRPM | TEMPERATURE: 98.5 F | WEIGHT: 197 LBS | HEART RATE: 68 BPM

## 2022-03-21 DIAGNOSIS — J30.2 SEASONAL ALLERGIC RHINITIS, UNSPECIFIED TRIGGER: Primary | ICD-10-CM

## 2022-03-21 PROCEDURE — 99213 OFFICE O/P EST LOW 20 MIN: CPT | Performed by: INTERNAL MEDICINE

## 2022-03-21 PROCEDURE — 1036F TOBACCO NON-USER: CPT | Performed by: INTERNAL MEDICINE

## 2022-03-21 PROCEDURE — 3008F BODY MASS INDEX DOCD: CPT | Performed by: INTERNAL MEDICINE

## 2022-03-21 RX ORDER — CETIRIZINE HYDROCHLORIDE 10 MG/1
10 TABLET ORAL DAILY
Qty: 30 TABLET | Refills: 2 | Status: SHIPPED | OUTPATIENT
Start: 2022-03-21 | End: 2022-05-16

## 2022-03-21 RX ORDER — FLUTICASONE PROPIONATE 50 MCG
2 SPRAY, SUSPENSION (ML) NASAL DAILY
Qty: 16 G | Refills: 2 | Status: SHIPPED | OUTPATIENT
Start: 2022-03-21 | End: 2022-04-13

## 2022-03-21 NOTE — PROGRESS NOTES
Assessment/Plan:     Diagnoses and all orders for this visit:    Seasonal allergic rhinitis, unspecified trigger  Comments:  c/w singulair and add flonase/zyrtec   limit allergen exposure  add flovent by end of week if not improving as expected  t/c allergy eval  Orders:  -     cetirizine (ZyrTEC) 10 mg tablet; Take 1 tablet (10 mg total) by mouth daily  -     fluticasone (FLONASE) 50 mcg/act nasal spray; 2 sprays into each nostril daily      BMI Counseling: Body mass index is 32 78 kg/m²  The BMI is above normal  Exercise recommendations include exercising 3-5 times per week  Subjective:      Patient ID: Lucy Nixon is a 63 y o  female  HPI    Here for follow up and c/o cont'd coughing  She was treated for post-viral cough syndrome in January after having a URI  She took flovent for a month which helped and stopped and she continued to cough  She has no phlegm production, fever, chills, SOB, wheezing, runny nose, watery eyes, sore throat or itchy ears  She does have occ chest tightness  She enjoys going for walks outdoors  She resumed her singulair 2 weeks ago but hasn't started an antihistamine or resumed her flonase  She didn't have allergies till she turned 49 y/o  She is coughing less now that she was in january and didn't notice she was still coughing till her friend mentioned it to her  ROS otherwise negative, no other complaints  Past Medical History:   Diagnosis Date    History of oral aphthous ulcers     last assessed 04/01/2013    Other specified menopausal and perimenopausal disorders (CODE)     last assessed 10/25/2013    Snoring     last assessed 08/29/2012     Vitals:    03/21/22 1629   BP: 124/78   Pulse: 68   Resp: 16   Temp: 98 5 °F (36 9 °C)   SpO2: 97%   Weight: 89 4 kg (197 lb)   Height: 5' 5" (1 651 m)     Body mass index is 32 78 kg/m²      Current Outpatient Medications:     esomeprazole (NexIUM) 20 mg capsule, Take 20 mg by mouth every morning before breakfast, Disp: , Rfl:     fluticasone (Flovent HFA) 110 MCG/ACT inhaler, Inhale 1-2 puffs 2 (two) times a day Rinse mouth after use , Disp: 12 g, Rfl: 0    LORazepam (ATIVAN) 0 5 mg tablet, Take 1 tablet (0 5 mg total) by mouth daily as needed for anxiety, Disp: 20 tablet, Rfl: 0    montelukast (SINGULAIR) 10 mg tablet, Take 1 tablet (10 mg total) by mouth daily at bedtime, Disp: 90 tablet, Rfl: 1    rizatriptan (MAXALT-MLT) 10 MG disintegrating tablet, PLACE 1 TABLET ON TONGUE AT ONSET OF MIGRAINE, MAY REPEAT IN 2 HOURS IF NEEDED  MAXIMUM OF 2 TABLETS PER DAY, Disp: 27 tablet, Rfl: 1    sertraline (ZOLOFT) 50 mg tablet, TAKE 1 TABLET DAILY AT BEDTIME, Disp: 90 tablet, Rfl: 1    cetirizine (ZyrTEC) 10 mg tablet, Take 1 tablet (10 mg total) by mouth daily, Disp: 30 tablet, Rfl: 2    fluticasone (FLONASE) 50 mcg/act nasal spray, 2 sprays into each nostril daily, Disp: 16 g, Rfl: 2  No Known Allergies      Review of Systems   Constitutional: Negative for chills and fever  HENT: Negative for ear pain, postnasal drip, rhinorrhea and sore throat  Respiratory: Positive for cough and chest tightness  Negative for shortness of breath and wheezing  Cardiovascular: Negative for chest pain  Musculoskeletal: Negative for myalgias  Skin: Negative for rash  Allergic/Immunologic: Positive for environmental allergies  Neurological: Negative for headaches  Psychiatric/Behavioral: Negative for dysphoric mood  Objective:      /78   Pulse 68   Temp 98 5 °F (36 9 °C)   Resp 16   Ht 5' 5" (1 651 m)   Wt 89 4 kg (197 lb)   SpO2 97%   BMI 32 78 kg/m²          Physical Exam  Vitals reviewed  Constitutional:       Appearance: Normal appearance  HENT:      Head: Normocephalic and atraumatic  Right Ear: Tympanic membrane normal  There is no impacted cerumen  Left Ear: Tympanic membrane normal  There is no impacted cerumen  Nose: Mucosal edema and congestion present        Right Sinus: No maxillary sinus tenderness or frontal sinus tenderness  Left Sinus: No maxillary sinus tenderness or frontal sinus tenderness  Mouth/Throat:      Mouth: Mucous membranes are moist       Pharynx: No posterior oropharyngeal erythema  Eyes:      Pupils: Pupils are equal, round, and reactive to light  Cardiovascular:      Rate and Rhythm: Normal rate and regular rhythm  Heart sounds: No murmur heard  Pulmonary:      Effort: Pulmonary effort is normal       Breath sounds: No wheezing or rales  Abdominal:      General: Bowel sounds are normal       Palpations: Abdomen is soft  Tenderness: There is no abdominal tenderness  Musculoskeletal:      Right lower leg: No edema  Left lower leg: No edema  Lymphadenopathy:      Cervical: No cervical adenopathy  Neurological:      Mental Status: She is alert  Mental status is at baseline  Psychiatric:         Mood and Affect: Mood normal          Behavior: Behavior normal          Study Result 1/18/22    CHEST      INDICATION:   R05 3: Chronic cough      COMPARISON:  No priors     EXAM PERFORMED/VIEWS:  XR CHEST PA & LATERAL        FINDINGS:     Cardiomediastinal silhouette appears unremarkable      The lungs are clear    No pneumothorax or pleural effusion      Osseous structures appear within normal limits for patient age      IMPRESSION:     No acute cardiopulmonary disease                  Workstation performed: [id number]

## 2022-03-21 NOTE — PATIENT INSTRUCTIONS
1  Continue singulair  2  Add zyrtec 10mg at bedtime  3  Add flonase nasal spray  4  If not better by end of this week -> add flovent inhaler    Allergic Rhinitis   AMBULATORY CARE:   Allergic rhinitis , or hay fever, is swelling of the inside of your nose  The swelling is a reaction to allergens in the air  An allergen can be anything that causes an allergic reaction  Allergies to weeds, grass, trees, or mold often cause seasonal allergic rhinitis  Indoor dust mites, cockroaches, pet dander, or mold can also cause allergic rhinitis  Common signs and symptoms include the following:   · Sneezing    · Nasal congestion    · Runny nose    · Itchy nose, eyes, or mouth    · Red, watery eyes    · Postnasal drip (nasal drainage down the back of your throat)    · Cough or frequent throat clearing    · Feeling tired or lethargic    · Dark circles under your eyes    Call 911 for the following:   · You have chest pain or shortness of breath  Seek care immediately if:   · You have severe pain  · You cough up blood  Contact your healthcare provider if:   · You have a fever  · You have ear or sinus pain, or a headache  · Your symptoms get worse, even after treatment  · You have yellow, green, brown, or bloody mucus coming from your nose  · Your nose is bleeding or you have pain inside your nose  · You have trouble sleeping because of your symptoms  · You have questions or concerns about your condition or care  Treatment:   · Antihistamines  help reduce itching, sneezing, and a runny nose  Some antihistamines can make you sleepy  · Nasal steroids  help decrease inflammation in your nose  · Decongestants  help clear your stuffy nose  · Immunotherapy  may be needed if your symptoms are severe or other treatments do not work  Immunotherapy is used to inject an allergen into your skin  At first, the therapy contains tiny amounts of the allergen   Your healthcare provider will slowly increase the amount of allergen  This may help your body be less sensitive to the allergen and stop reacting to it  You may need immunotherapy for weeks or longer  Manage allergic rhinitis:  The best way to manage allergic rhinitis is to avoid allergens that can trigger your symptoms  Any of the following may help decrease your symptoms:  · Rinse your nose and sinuses  with a salt water solution or use a salt water nasal spray  This will help thin the mucus in your nose and rinse away pollen and dirt  It will also help reduce swelling so you can breathe normally  Ask your healthcare provider how often to rinse your nose  · Reduce exposure to dust mites  Wash sheets and towels in hot water every week  Cover your pillows and mattresses with allergen-free covers  Limit the number of stuffed animals and soft toys your child has  Wash your child's toys in hot water regularly  Vacuum weekly and use a vacuum  with an air filter  If possible, get rid of carpets and curtains  These collect dust and dust mites  · Reduce exposure to pollen  Keep windows and doors closed in your house and car  Stay inside when air pollution or the pollen count is high  Run your air conditioner on recycle, and change air filters often  Shower and wash your hair before bed every night to rinse away pollen  · Reduce exposure to pet dander  If possible, do not keep cats, dogs, birds, or other pets  If you do keep pets in your home, keep them out of bedrooms and carpeted rooms  Bathe them often  · Reduce exposure to mold  Do not spend time in basements  Choose artificial plants instead of live plants  Keep your home's humidity at less than 45%  Do not have ponds or standing water in your home or yard  · Do not smoke  Avoid others who smoke  Ask your healthcare provider for information if you currently smoke and need help to quit      Follow up with your doctor as directed:  Write down your questions so you remember to ask them during your visits  © Copyright [employer] 2022 Information is for End User's use only and may not be sold, redistributed or otherwise used for commercial purposes  All illustrations and images included in CareNotes® are the copyrighted property of A D A M , Inc  or [employer]  The above information is an  only  It is not intended as medical advice for individual conditions or treatments  Talk to your doctor, nurse or pharmacist before following any medical regimen to see if it is safe and effective for you

## 2022-04-13 DIAGNOSIS — J30.2 SEASONAL ALLERGIC RHINITIS, UNSPECIFIED TRIGGER: ICD-10-CM

## 2022-04-13 RX ORDER — FLUTICASONE PROPIONATE 50 MCG
SPRAY, SUSPENSION (ML) NASAL
Qty: 16 ML | Refills: 2 | Status: SHIPPED | OUTPATIENT
Start: 2022-04-13

## 2022-05-14 DIAGNOSIS — J30.2 SEASONAL ALLERGIC RHINITIS, UNSPECIFIED TRIGGER: ICD-10-CM

## 2022-05-16 RX ORDER — CETIRIZINE HYDROCHLORIDE 10 MG/1
TABLET ORAL
Qty: 30 TABLET | Refills: 2 | Status: SHIPPED | OUTPATIENT
Start: 2022-05-16

## 2022-05-24 ENCOUNTER — OFFICE VISIT (OUTPATIENT)
Dept: INTERNAL MEDICINE CLINIC | Facility: CLINIC | Age: 62
End: 2022-05-24
Payer: COMMERCIAL

## 2022-05-24 VITALS
OXYGEN SATURATION: 98 % | RESPIRATION RATE: 16 BRPM | SYSTOLIC BLOOD PRESSURE: 122 MMHG | HEART RATE: 68 BPM | TEMPERATURE: 98.2 F | BODY MASS INDEX: 31.99 KG/M2 | HEIGHT: 65 IN | DIASTOLIC BLOOD PRESSURE: 78 MMHG | WEIGHT: 192 LBS

## 2022-05-24 DIAGNOSIS — J30.2 SEASONAL ALLERGIC RHINITIS, UNSPECIFIED TRIGGER: ICD-10-CM

## 2022-05-24 DIAGNOSIS — G43.009 MIGRAINE WITHOUT AURA AND WITHOUT STATUS MIGRAINOSUS, NOT INTRACTABLE: ICD-10-CM

## 2022-05-24 DIAGNOSIS — K21.00 GASTROESOPHAGEAL REFLUX DISEASE WITH ESOPHAGITIS WITHOUT HEMORRHAGE: Primary | ICD-10-CM

## 2022-05-24 DIAGNOSIS — Z79.899 LONG-TERM CURRENT USE OF PROTON PUMP INHIBITOR THERAPY: ICD-10-CM

## 2022-05-24 DIAGNOSIS — R79.89 LOW VITAMIN D LEVEL: ICD-10-CM

## 2022-05-24 DIAGNOSIS — F41.9 ANXIETY: ICD-10-CM

## 2022-05-24 DIAGNOSIS — E78.5 DYSLIPIDEMIA: ICD-10-CM

## 2022-05-24 PROCEDURE — 3008F BODY MASS INDEX DOCD: CPT | Performed by: INTERNAL MEDICINE

## 2022-05-24 PROCEDURE — 99214 OFFICE O/P EST MOD 30 MIN: CPT | Performed by: INTERNAL MEDICINE

## 2022-05-24 PROCEDURE — 1036F TOBACCO NON-USER: CPT | Performed by: INTERNAL MEDICINE

## 2022-05-24 NOTE — PROGRESS NOTES
Assessment/Plan:     Diagnoses and all orders for this visit:    Gastroesophageal reflux disease with esophagitis without hemorrhage  Comments:  taking PPI and stable, c/w rx  Orders:  -     CBC and differential; Future  -     Vitamin B12; Future  -     Magnesium; Future    Seasonal allergic rhinitis, unspecified trigger  Comments:  stable, c/w singulair and zyrtec and okay to stop zyrtec in 1-3 weeks    Migraine without aura and without status migrainosus, not intractable  Comments:  stable since menopause, c/w maxalt prn    Low vitamin D level  Comments:  taking vit D and stable, c/w rx and check level with next BW  Orders:  -     Vitamin D 25 hydroxy; Future    Dyslipidemia  Comments:  improved on last BW   ASCVD risk score of 2 5%, Check lipid profile with next BW  Orders:  -     Comprehensive metabolic panel; Future  -     Lipid Panel with Direct LDL reflex; Future    Anxiety  Comments:  stable, c/w sertraline daily and lorazepam prn  Orders:  -     CBC and differential; Future    Long-term current use of proton pump inhibitor therapy  -     Vitamin B12; Future  -     Magnesium; Future          Subjective:      Patient ID: Ondina Ku is a 63 y o  female  HPI    Here for follow up, Nathalie is overall doing well  She is taking her medications as prescribed  Her headaches have improve since she has become dipesh-menopausal   She is taking montelukast and zyrtec and doing better, didn't need to take flovent  She would like to stop zyrtec  She has no new questions or concerns today otherwise & ROS otherwise is negative        Past Medical History:   Diagnosis Date    History of oral aphthous ulcers     last assessed 04/01/2013    Other specified menopausal and perimenopausal disorders (CODE)     last assessed 10/25/2013    Snoring     last assessed 08/29/2012     Vitals:    05/24/22 1224   BP: 122/78   Pulse: 68   Resp: 16   Temp: 98 2 °F (36 8 °C)   SpO2: 98%   Weight: 87 1 kg (192 lb)   Height: 5' 5" (1 651 m)     Body mass index is 31 95 kg/m²  Current Outpatient Medications:     cetirizine (ZyrTEC) 10 mg tablet, TAKE 1 TABLET BY MOUTH EVERY DAY, Disp: 30 tablet, Rfl: 2    esomeprazole (NexIUM) 20 mg capsule, Take 20 mg by mouth every morning before breakfast, Disp: , Rfl:     fluticasone (FLONASE) 50 mcg/act nasal spray, SPRAY 2 SPRAYS INTO EACH NOSTRIL EVERY DAY, Disp: 16 mL, Rfl: 2    LORazepam (ATIVAN) 0 5 mg tablet, Take 1 tablet (0 5 mg total) by mouth daily as needed for anxiety, Disp: 20 tablet, Rfl: 0    montelukast (SINGULAIR) 10 mg tablet, Take 1 tablet (10 mg total) by mouth daily at bedtime, Disp: 90 tablet, Rfl: 1    rizatriptan (MAXALT-MLT) 10 MG disintegrating tablet, PLACE 1 TABLET ON TONGUE AT ONSET OF MIGRAINE, MAY REPEAT IN 2 HOURS IF NEEDED  MAXIMUM OF 2 TABLETS PER DAY, Disp: 27 tablet, Rfl: 1    sertraline (ZOLOFT) 50 mg tablet, TAKE 1 TABLET DAILY AT BEDTIME, Disp: 90 tablet, Rfl: 1  No Known Allergies      Review of Systems   Constitutional: Negative for fever  HENT: Negative for congestion  Eyes: Negative for visual disturbance  Respiratory: Negative for shortness of breath  Cardiovascular: Negative for chest pain  Gastrointestinal: Negative for abdominal pain  Endocrine: Negative for polyuria  Genitourinary: Negative for difficulty urinating  Musculoskeletal: Negative for arthralgias  Skin: Negative for rash  Allergic/Immunologic: Positive for environmental allergies  Neurological: Negative for headaches (much improved)  Psychiatric/Behavioral: Negative for dysphoric mood  Objective:      /78   Pulse 68   Temp 98 2 °F (36 8 °C)   Resp 16   Ht 5' 5" (1 651 m)   Wt 87 1 kg (192 lb)   SpO2 98%   BMI 31 95 kg/m²          Physical Exam  Vitals reviewed  Constitutional:       Appearance: Normal appearance  HENT:      Head: Normocephalic and atraumatic        Right Ear: Tympanic membrane normal       Left Ear: Tympanic membrane normal  Eyes:      Conjunctiva/sclera: Conjunctivae normal    Cardiovascular:      Rate and Rhythm: Normal rate and regular rhythm  Heart sounds: No murmur heard  Pulmonary:      Effort: Pulmonary effort is normal       Breath sounds: No wheezing or rales  Abdominal:      General: Bowel sounds are normal       Palpations: Abdomen is soft  Tenderness: There is no abdominal tenderness  Musculoskeletal:      Right lower leg: No edema  Left lower leg: No edema  Neurological:      Mental Status: She is alert  Mental status is at baseline     Psychiatric:         Mood and Affect: Mood normal          Behavior: Behavior normal

## 2022-10-14 DIAGNOSIS — J30.2 SEASONAL ALLERGIC RHINITIS, UNSPECIFIED TRIGGER: ICD-10-CM

## 2022-10-14 RX ORDER — CETIRIZINE HYDROCHLORIDE 10 MG/1
TABLET ORAL
Qty: 90 TABLET | Refills: 3 | Status: SHIPPED | OUTPATIENT
Start: 2022-10-14

## 2022-11-03 ENCOUNTER — OFFICE VISIT (OUTPATIENT)
Dept: INTERNAL MEDICINE CLINIC | Facility: CLINIC | Age: 62
End: 2022-11-03

## 2022-11-03 VITALS
RESPIRATION RATE: 15 BRPM | WEIGHT: 199 LBS | BODY MASS INDEX: 33.15 KG/M2 | OXYGEN SATURATION: 96 % | HEIGHT: 65 IN | DIASTOLIC BLOOD PRESSURE: 84 MMHG | SYSTOLIC BLOOD PRESSURE: 124 MMHG | HEART RATE: 93 BPM

## 2022-11-03 DIAGNOSIS — E66.9 CLASS 1 OBESITY WITHOUT SERIOUS COMORBIDITY WITH BODY MASS INDEX (BMI) OF 33.0 TO 33.9 IN ADULT, UNSPECIFIED OBESITY TYPE: ICD-10-CM

## 2022-11-03 DIAGNOSIS — Z23 FLU VACCINE NEED: ICD-10-CM

## 2022-11-03 DIAGNOSIS — E78.5 DYSLIPIDEMIA: ICD-10-CM

## 2022-11-03 DIAGNOSIS — G43.009 MIGRAINE WITHOUT AURA AND WITHOUT STATUS MIGRAINOSUS, NOT INTRACTABLE: ICD-10-CM

## 2022-11-03 DIAGNOSIS — F41.9 ANXIETY: ICD-10-CM

## 2022-11-03 DIAGNOSIS — K21.00 GASTROESOPHAGEAL REFLUX DISEASE WITH ESOPHAGITIS WITHOUT HEMORRHAGE: ICD-10-CM

## 2022-11-03 DIAGNOSIS — Z00.00 WELLNESS EXAMINATION: Primary | ICD-10-CM

## 2022-11-03 RX ORDER — DICLOFENAC SODIUM 75 MG/1
TABLET, DELAYED RELEASE ORAL AS NEEDED
COMMUNITY
Start: 2022-09-01

## 2022-11-03 NOTE — PATIENT INSTRUCTIONS
Flu vaccine today  Fasting blood work  Return in 6 months  GI specialist appointment  Voltaren gel twice a day as needed

## 2022-11-03 NOTE — PROGRESS NOTES
Name: Issa Sawyer      : 1960      MRN: [id number]  Encounter Provider: Nathan Tesfaye DO  Encounter Date: 11/3/2022   Encounter department: [place] [id number]     1  Wellness examination    2  Anxiety  Comments:  sertraline is helping, c/w rx and at current dose    3  Dyslipidemia  Comments:  with low ASCVD risk score, c/w lower cholesterol intake from diet and check lipids again with next BW    4  Migraine without aura and without status migrainosus, not intractable  Comments:  having less migraine headaches in general lately, c/w maxalt prn    5  Gastroesophageal reflux disease with esophagitis without hemorrhage  Comments:  taking PPI and stable, c/w rx  F/u with GI for EGD    6  Class 1 obesity without serious comorbidity with body mass index (BMI) of 33 0 to 33 9 in adult, unspecified obesity type    7  Flu vaccine need  -     influenza vaccine, quadrivalent, recombinant, PF, 0 5 mL, for patients 18 yr+ (FLUBLOK)        Depression Screening and Follow-up Plan: Patient was screened for depression during today's encounter  They screened negative with a PHQ-2 score of 0  BMI Counseling: Body mass index is 33 12 kg/m²  The BMI is above normal  Nutrition recommendations include reducing intake of cholesterol  Exercise recommendations include exercising 3-5 times per week     Advised on trial of voltaren gel for Knee OA/Pain  She will try the medication (OTC)  Subjective      HPI     Here for physical, Nathalie is overall doing well  She tried stopping sertraline this summer but felt her anxiety getting worse and resumed the rx  She is taking montelukast for allergies and requests flu vaccine today  She has not been able to walk as much due to right knee pain/OA/meniscal tear  She saw orthopedics and was prescribed voltaren PO which she rarely takes  ROS otherwise negative, no other complaints  Review of Systems   Constitutional: Negative for fever  HENT: Negative for congestion  Eyes: Negative for visual disturbance  Respiratory: Negative for shortness of breath  Cardiovascular: Negative for chest pain  Gastrointestinal: Negative for abdominal pain  Endocrine: Negative for polyuria  Genitourinary: Negative for difficulty urinating  Musculoskeletal: Positive for arthralgias (right knee)  Skin: Negative for rash  Allergic/Immunologic: Positive for environmental allergies  Neurological: Negative for headaches (stable for sometime)  Psychiatric/Behavioral: Negative for dysphoric mood  Current Outpatient Medications on File Prior to Visit   Medication Sig   • diclofenac (VOLTAREN) 75 mg EC tablet as needed   • esomeprazole (NexIUM) 20 mg capsule Take 20 mg by mouth every morning before breakfast   • LORazepam (ATIVAN) 0 5 mg tablet Take 1 tablet (0 5 mg total) by mouth daily as needed for anxiety   • montelukast (SINGULAIR) 10 mg tablet Take 1 tablet (10 mg total) by mouth daily at bedtime   • rizatriptan (MAXALT-MLT) 10 MG disintegrating tablet PLACE 1 TABLET ON TONGUE AT ONSET OF MIGRAINE, MAY REPEAT IN 2 HOURS IF NEEDED  MAXIMUM OF 2 TABLETS PER DAY   • sertraline (ZOLOFT) 50 mg tablet TAKE 1 TABLET DAILY AT BEDTIME   • [DISCONTINUED] cetirizine (ZyrTEC) 10 mg tablet TAKE 1 TABLET BY MOUTH EVERY DAY   • [DISCONTINUED] fluticasone (FLONASE) 50 mcg/act nasal spray SPRAY 2 SPRAYS INTO EACH NOSTRIL EVERY DAY       Objective     /84   Pulse 93   Resp 15   Ht 5' 5" (1 651 m)   Wt 90 3 kg (199 lb)   SpO2 96%   BMI 33 12 kg/m²     Physical Exam  Vitals reviewed  Constitutional:       Appearance: Normal appearance  HENT:      Head: Normocephalic and atraumatic  Right Ear: Tympanic membrane normal       Left Ear: Tympanic membrane normal    Eyes:      Conjunctiva/sclera: Conjunctivae normal    Cardiovascular:      Rate and Rhythm: Normal rate and regular rhythm  Heart sounds: No murmur heard    Pulmonary: Effort: Pulmonary effort is normal       Breath sounds: No wheezing or rales  Abdominal:      General: Bowel sounds are normal       Palpations: Abdomen is soft  Tenderness: There is no abdominal tenderness  Musculoskeletal:      Right lower leg: No edema  Left lower leg: No edema  Neurological:      Mental Status: She is alert  Mental status is at baseline     Psychiatric:         Mood and Affect: Mood normal          Behavior: Behavior normal        Naresh Diamond DO

## 2022-11-09 ENCOUNTER — TELEPHONE (OUTPATIENT)
Dept: GASTROENTEROLOGY | Facility: CLINIC | Age: 62
End: 2022-11-09

## 2022-11-09 NOTE — TELEPHONE ENCOUNTER
Pt is due for colon now, there is no recall for egd so I sent records to [facility]  Once records are in chart I will triage Dr to see when egd is due

## 2022-11-09 NOTE — TELEPHONE ENCOUNTER
Patients GI provider:  Dr Delfina Banerjee    Number to return call: [phone]    Reason for call: Pt calling stating she thinks she is due for a colonoscopy and EGD  She would like a call back to confirm she is in fact due for the EGD  Scheduled procedure/appointment date if applicable: no upcoming apts or procedures

## 2022-11-15 ENCOUNTER — TELEPHONE (OUTPATIENT)
Dept: GASTROENTEROLOGY | Facility: CLINIC | Age: 62
End: 2022-11-15

## 2022-11-15 ENCOUNTER — PREP FOR PROCEDURE (OUTPATIENT)
Dept: GASTROENTEROLOGY | Facility: CLINIC | Age: 62
End: 2022-11-15

## 2022-11-15 DIAGNOSIS — Z86.010 HISTORY OF COLON POLYPS: Primary | ICD-10-CM

## 2022-11-15 DIAGNOSIS — D12.6 TUBULAR ADENOMA OF COLON: ICD-10-CM

## 2022-11-15 DIAGNOSIS — Z80.0 FAMILY HISTORY OF COLON CANCER IN MOTHER: ICD-10-CM

## 2022-11-15 NOTE — TELEPHONE ENCOUNTER
[facility] Assessment    Name: Eleanor Lopez  YOB: 1960  Last Height: 5' 5" (1 651 m)  Last weight: 90 3 kg (199 lb)  BMI: 33 12 kg/m²  Procedure: Colon  Diagnosis: hx of polyps, ta, family hx of colon ca(mom)  Date of procedure: 01/30/23  Prep: will be discussed with   Responsible :   Phone#: [phone]  Name completing form: Jesus Ankur  Date form completed: 11/15/22      If the patient answers yes to any of these questions, schedule in a hospital  Are you pregnant: No  Do you rely on a wheelchair for mobility: No  Have you been diagnosed with End Stage Renal Disease (ESRD): No  Do you need oxygen during the day: No  Have you had a heart attack or stroke within the past three months: No  Have you had a seizure within the past three months: No  Have you ever been informed by anesthesia that you have a difficult airway: No  Additional Questions  Have you had any cardiac testing or are under the care of a Cardiologist (see cardiac list): No  Cardiac list:   Do you have an implanted cardiac defibrillator: No (Comment:  This patient should be scheduled in the hospital)    Have any bleeding problems, such as anemia or hemophilia (If patient has H&H result below 8, schedule in hospital   H&H must be within 30 days of procedure): No    Had an organ transplant within the past 3 months: No    Do you have any present infections: No  Do you get short of breath when walking a few blocks: No  Have you been diagnosed with diabetes: No  Comments (provide cardiac provider information if applicable):

## 2022-11-15 NOTE — TELEPHONE ENCOUNTER
Scheduled date of colonoscopy (as of today): 01/30/23  Physician performing colonoscopy:Dr Farhana Seay  Location of colonoscopy:[facility]  Bowel prep reviewed with patient:will be discussed by   Instructions reviewed with patient by:ti  Clearances: n/a

## 2022-11-15 NOTE — TELEPHONE ENCOUNTER
11/15/22  Screened by: Marina Marie    Referring Provider Dr Jacques Neil recall    Pre- Screening: There is no height or weight on file to calculate BMI  Has patient been referred for a routine screening Colonoscopy? no  Is the patient between 39-70 years old? yes      Previous Colonoscopy yes   If yes:    Date: 12/10/19    Facility:     Reason:       SCHEDULING STAFF: If the patient is between 39yrs-47yrs, please advise patient to confirm benefits/coverage with their insurance company for a routine screening colonoscopy, some insurance carriers will only cover at [address] or older  If the patient is over 66years old, please schedule an office visit  Does the patient want to see a Gastroenterologist prior to their procedure OR are they having any GI symptoms? no    Has the patient been hospitalized or had abdominal surgery in the past 6 months? no    Does the patient use supplemental oxygen? no    Does the patient take Coumadin, Lovenox, Plavix, Elliquis, Xarelto, or other blood thinning medication? no    Has the patient had a stroke, cardiac event, or stent placed in the past year? no    SCHEDULING STAFF: If patient answers NO to above questions, then schedule procedure  If patient answers YES to above questions, then schedule office appointment  If patient is between 45yrs - 49yrs, please advise patient that we will have to confirm benefits & coverage with their insurance company for a routine screening colonoscopy

## 2022-11-16 ENCOUNTER — TELEPHONE (OUTPATIENT)
Dept: GASTROENTEROLOGY | Facility: CLINIC | Age: 62
End: 2022-11-16

## 2022-11-16 NOTE — TELEPHONE ENCOUNTER
Pt has colon scheduled  Can you please review records to see if she is due for an egd at this time?  Thanks Katie Avila

## 2022-11-17 LAB
25(OH)D3 SERPL-MCNC: 34 NG/ML (ref 30–100)
ALBUMIN SERPL-MCNC: 4.4 G/DL (ref 3.6–5.1)
ALBUMIN/GLOB SERPL: 1.9 (CALC) (ref 1–2.5)
ALP SERPL-CCNC: 92 U/L (ref 37–153)
ALT SERPL-CCNC: 18 U/L (ref 6–29)
AST SERPL-CCNC: 18 U/L (ref 10–35)
BASOPHILS # BLD AUTO: 39 CELLS/UL (ref 0–200)
BASOPHILS NFR BLD AUTO: 1 %
BILIRUB SERPL-MCNC: 0.5 MG/DL (ref 0.2–1.2)
BUN SERPL-MCNC: 17 MG/DL (ref 7–25)
BUN/CREAT SERPL: ABNORMAL (CALC) (ref 6–22)
CALCIUM SERPL-MCNC: 9.4 MG/DL (ref 8.6–10.4)
CHLORIDE SERPL-SCNC: 105 MMOL/L (ref 98–110)
CHOLEST SERPL-MCNC: 272 MG/DL
CHOLEST/HDLC SERPL: 3 (CALC)
CO2 SERPL-SCNC: 26 MMOL/L (ref 20–32)
CREAT SERPL-MCNC: 0.83 MG/DL (ref 0.5–1.05)
EOSINOPHIL # BLD AUTO: 179 CELLS/UL (ref 15–500)
EOSINOPHIL NFR BLD AUTO: 4.6 %
ERYTHROCYTE [DISTWIDTH] IN BLOOD BY AUTOMATED COUNT: 12.7 % (ref 11–15)
GFR/BSA.PRED SERPLBLD CYS-BASED-ARV: 81 ML/MIN/1.73M2
GLOBULIN SER CALC-MCNC: 2.3 G/DL (CALC) (ref 1.9–3.7)
GLUCOSE SERPL-MCNC: 104 MG/DL (ref 65–99)
HCT VFR BLD AUTO: 42.4 % (ref 35–45)
HDLC SERPL-MCNC: 90 MG/DL
HGB BLD-MCNC: 14.1 G/DL (ref 11.7–15.5)
LDLC SERPL CALC-MCNC: 156 MG/DL (CALC)
LYMPHOCYTES # BLD AUTO: 1330 CELLS/UL (ref 850–3900)
LYMPHOCYTES NFR BLD AUTO: 34.1 %
MAGNESIUM SERPL-MCNC: 2.2 MG/DL (ref 1.5–2.5)
MCH RBC QN AUTO: 29.8 PG (ref 27–33)
MCHC RBC AUTO-ENTMCNC: 33.3 G/DL (ref 32–36)
MCV RBC AUTO: 89.6 FL (ref 80–100)
MONOCYTES # BLD AUTO: 250 CELLS/UL (ref 200–950)
MONOCYTES NFR BLD AUTO: 6.4 %
NEUTROPHILS # BLD AUTO: 2102 CELLS/UL (ref 1500–7800)
NEUTROPHILS NFR BLD AUTO: 53.9 %
NONHDLC SERPL-MCNC: 182 MG/DL (CALC)
PLATELET # BLD AUTO: 178 THOUSAND/UL (ref 140–400)
PMV BLD REES-ECKER: 12.1 FL (ref 7.5–12.5)
POTASSIUM SERPL-SCNC: 4.5 MMOL/L (ref 3.5–5.3)
PROT SERPL-MCNC: 6.7 G/DL (ref 6.1–8.1)
RBC # BLD AUTO: 4.73 MILLION/UL (ref 3.8–5.1)
SODIUM SERPL-SCNC: 140 MMOL/L (ref 135–146)
TRIGL SERPL-MCNC: 132 MG/DL
VIT B12 SERPL-MCNC: 313 PG/ML (ref 200–1100)
WBC # BLD AUTO: 3.9 THOUSAND/UL (ref 3.8–10.8)

## 2022-11-18 ENCOUNTER — VBI (OUTPATIENT)
Dept: ADMINISTRATIVE | Facility: OTHER | Age: 62
End: 2022-11-18

## 2022-12-07 ENCOUNTER — TELEPHONE (OUTPATIENT)
Dept: ADMINISTRATIVE | Facility: OTHER | Age: 62
End: 2022-12-07

## 2022-12-07 NOTE — TELEPHONE ENCOUNTER
Upon review of the In Basket request we were able to locate, review, and update the patient chart as requested for Mammogram     Any additional questions or concerns should be emailed to the Practice Liaisons via the appropriate education email address, please do not reply via In Basket      Thank you  Yolis Dubois MA

## 2022-12-07 NOTE — TELEPHONE ENCOUNTER
----- Message from Hany Perez MA sent at 12/7/2022 10:26 AM EST -----  Regarding: Mammogram  12/07/22 10:27 AM    Hello, our patient Mitzi Frank has had Mammogram completed/performed  Please assist in updating the patient chart by pulling the Care Everywhere (CE) document  The date of service is 10/2022 at        Thank you,  Hany Perez MA  [facility] INTERNAL MED

## 2023-01-12 ENCOUNTER — NURSE TRIAGE (OUTPATIENT)
Dept: OTHER | Facility: OTHER | Age: 63
End: 2023-01-12

## 2023-01-12 NOTE — TELEPHONE ENCOUNTER
Requires follow up  Not able to fully answer  Reason for Disposition  • Caller has medicine question only, adult not sick, AND triager answers question    Answer Assessment - Initial Assessment Questions  1   NAME of MEDICATION: "What medicine are you calling about?"      unsure    Protocols used: MEDICATION QUESTION CALL-ADULT-

## 2023-01-12 NOTE — TELEPHONE ENCOUNTER
Pt for colonoscopy on 1/30/23 does not know if using over the counter prep or if one is being sent to the pharmacy

## 2023-01-12 NOTE — TELEPHONE ENCOUNTER
Regarding: Prep and instructions  ----- Message from Vik Cabello sent at 1/12/2023  3:30 PM EST -----  "I need prep instruction and prescription for my colonoscopy "

## 2023-01-23 ENCOUNTER — TELEPHONE (OUTPATIENT)
Dept: GASTROENTEROLOGY | Facility: CLINIC | Age: 63
End: 2023-01-23

## 2023-01-23 ENCOUNTER — NURSE TRIAGE (OUTPATIENT)
Dept: OTHER | Facility: OTHER | Age: 63
End: 2023-01-23

## 2023-01-23 NOTE — TELEPHONE ENCOUNTER
I called patient and she has already received her prep instructions, she will call if she has any questions

## 2023-01-23 NOTE — TELEPHONE ENCOUNTER
Regarding: Colonoscopy prep  ----- Message from Lex Levin RN sent at 1/23/2023 10:15 AM EST -----  "I have a colonoscopy next Monday   I was told to call for the prep "

## 2023-01-23 NOTE — TELEPHONE ENCOUNTER
Left message for patient confirming procedure with Dr Osbaldo Stock on 1/30/23 at [facility]  Reminded her she will need a  to and from and I will email her instructions for prep  She is to call if none received

## 2023-01-23 NOTE — TELEPHONE ENCOUNTER
Patient called in stating she hasnt received her prep instructions  States instructions can be sent via [employer]  Unable to find which prep patient should be using  Advised office will contact her and send instructions via [employer]  Verbalized understanding     Reason for Disposition  • Nursing judgment    Protocols used: INFORMATION ONLY CALL - NO TRIAGE-ADULT-[place]

## 2023-01-27 ENCOUNTER — OFFICE VISIT (OUTPATIENT)
Dept: INTERNAL MEDICINE CLINIC | Facility: CLINIC | Age: 63
End: 2023-01-27

## 2023-01-27 VITALS
OXYGEN SATURATION: 96 % | RESPIRATION RATE: 16 BRPM | HEIGHT: 65 IN | DIASTOLIC BLOOD PRESSURE: 76 MMHG | TEMPERATURE: 97 F | BODY MASS INDEX: 32.79 KG/M2 | SYSTOLIC BLOOD PRESSURE: 132 MMHG | HEART RATE: 93 BPM | WEIGHT: 196.8 LBS

## 2023-01-27 DIAGNOSIS — J30.89 NON-SEASONAL ALLERGIC RHINITIS, UNSPECIFIED TRIGGER: ICD-10-CM

## 2023-01-27 DIAGNOSIS — R05.3 PERSISTENT DRY COUGH: Primary | ICD-10-CM

## 2023-01-27 NOTE — PROGRESS NOTES
Name: Joel Payne      : 1960      MRN: [id number]  Encounter Provider: Nicole Huff DO  Encounter Date: 2023   Encounter department: [place] [address]     1  Persistent dry cough  Comments:  suspect this is driven by postnasal drip   patient already taking a PPI daily  Re-start zyrtec and flonase  She will update the office in 1 wk via phone/mychart    2  Non-seasonal allergic rhinitis, unspecified trigger  Comments:  c/w plan as above  if not improving, t/c ICS such as flovent +/- CXR           Subjective      Cough  This is a recurrent problem  The current episode started more than 1 month ago  The problem has been gradually worsening  The problem occurs constantly  The cough is non-productive  Pertinent negatives include no chest pain, chills, ear congestion, ear pain, fever, headaches, heartburn, hemoptysis, myalgias, nasal congestion, postnasal drip, rash, rhinorrhea, sore throat, shortness of breath, sweats, weight loss or wheezing  The symptoms are aggravated by lying down  Risk factors for lung disease include smoking/tobacco exposure  Her past medical history is significant for environmental allergies  Symptoms as above  Patient did not have a URI prior to symptoms starting  She had a similar episode in 2022 which was treated with flovent inhaler successfully  She is not taking montelukast or allergy meds during the winter season  She has no fever, SOB, wheezing, chest tightness, congestion, runny nose or sinus pressure but does feel a tickle in her throat  She is taking PPI regularly  She has had more stress lately, taking care of her mother who is 79 y/o(chain smoker) and her 3 sisters who have medical problems  ROS otherwise negative, no other complaints  Review of Systems   Constitutional: Negative for chills, fever and weight loss  HENT: Negative for ear pain, postnasal drip, rhinorrhea and sore throat      Respiratory: Positive for cough  Negative for hemoptysis, shortness of breath and wheezing  Cardiovascular: Negative for chest pain  Gastrointestinal: Negative for heartburn  Musculoskeletal: Negative for myalgias  Skin: Negative for rash  Allergic/Immunologic: Positive for environmental allergies  Neurological: Negative for headaches  Current Outpatient Medications on File Prior to Visit   Medication Sig   • esomeprazole (NexIUM) 20 mg capsule Take 20 mg by mouth every morning before breakfast   • LORazepam (ATIVAN) 0 5 mg tablet Take 1 tablet (0 5 mg total) by mouth daily as needed for anxiety   • montelukast (SINGULAIR) 10 mg tablet Take 1 tablet (10 mg total) by mouth daily at bedtime   • rizatriptan (MAXALT-MLT) 10 MG disintegrating tablet PLACE 1 TABLET ON TONGUE AT ONSET OF MIGRAINE, MAY REPEAT IN 2 HOURS IF NEEDED  MAXIMUM OF 2 TABLETS PER DAY   • sertraline (ZOLOFT) 50 mg tablet TAKE 1 TABLET DAILY AT BEDTIME   • [DISCONTINUED] diclofenac (VOLTAREN) 75 mg EC tablet as needed       Objective     /76 (BP Location: Left arm, Patient Position: Sitting, Cuff Size: Standard)   Pulse 93   Temp (!) 97 °F (36 1 °C) (Tympanic)   Resp 16   Ht 5' 5" (1 651 m)   Wt 89 3 kg (196 lb 12 8 oz)   SpO2 96%   BMI 32 75 kg/m²     Physical Exam  Vitals reviewed  Constitutional:       General: She is not in acute distress  Appearance: Normal appearance  Comments: occ coughing and clearing of throat during the visit   HENT:      Head: Normocephalic and atraumatic  Right Ear: Tympanic membrane normal       Left Ear: Tympanic membrane normal       Nose: No rhinorrhea  Right Turbinates: Swollen  Left Turbinates: Swollen  Right Sinus: No maxillary sinus tenderness or frontal sinus tenderness  Left Sinus: No maxillary sinus tenderness or frontal sinus tenderness  Mouth/Throat:      Pharynx: No posterior oropharyngeal erythema     Eyes:      Conjunctiva/sclera: Conjunctivae normal    Cardiovascular:      Rate and Rhythm: Normal rate and regular rhythm  Heart sounds: No murmur heard  Pulmonary:      Effort: Pulmonary effort is normal  No respiratory distress  Breath sounds: Normal breath sounds  No wheezing or rales  Abdominal:      General: Bowel sounds are normal       Palpations: Abdomen is soft  Lymphadenopathy:      Cervical: No cervical adenopathy  Neurological:      Mental Status: She is alert  Mental status is at baseline     Psychiatric:         Mood and Affect: Mood normal          Behavior: Behavior normal        Naresh Diamond DO

## 2023-01-27 NOTE — PATIENT INSTRUCTIONS
Flonase 1-2 sprays each nostril daily  Zyrtec 10mg every day at bedtime  Call or update via [employer] in a week with how you are doing
perirectal

## 2023-01-28 DIAGNOSIS — F41.9 ANXIETY: ICD-10-CM

## 2023-01-30 ENCOUNTER — ANESTHESIA (OUTPATIENT)
Dept: GASTROENTEROLOGY | Facility: AMBULATORY SURGERY CENTER | Age: 63
End: 2023-01-30

## 2023-01-30 ENCOUNTER — HOSPITAL ENCOUNTER (OUTPATIENT)
Dept: GASTROENTEROLOGY | Facility: AMBULATORY SURGERY CENTER | Age: 63
Discharge: HOME | End: 2023-01-30

## 2023-01-30 ENCOUNTER — ANESTHESIA EVENT (OUTPATIENT)
Dept: GASTROENTEROLOGY | Facility: AMBULATORY SURGERY CENTER | Age: 63
End: 2023-01-30

## 2023-01-30 VITALS
WEIGHT: 190 LBS | TEMPERATURE: 96.9 F | HEIGHT: 65 IN | HEART RATE: 85 BPM | RESPIRATION RATE: 18 BRPM | OXYGEN SATURATION: 98 % | DIASTOLIC BLOOD PRESSURE: 72 MMHG | SYSTOLIC BLOOD PRESSURE: 130 MMHG | BODY MASS INDEX: 31.65 KG/M2

## 2023-01-30 DIAGNOSIS — Z86.010 HISTORY OF COLON POLYPS: ICD-10-CM

## 2023-01-30 DIAGNOSIS — Z80.0 FAMILY HISTORY OF COLON CANCER IN MOTHER: ICD-10-CM

## 2023-01-30 DIAGNOSIS — D12.6 TUBULAR ADENOMA OF COLON: ICD-10-CM

## 2023-01-30 RX ORDER — SODIUM CHLORIDE, SODIUM LACTATE, POTASSIUM CHLORIDE, CALCIUM CHLORIDE 600; 310; 30; 20 MG/100ML; MG/100ML; MG/100ML; MG/100ML
INJECTION, SOLUTION INTRAVENOUS CONTINUOUS PRN
Status: DISCONTINUED | OUTPATIENT
Start: 2023-01-30 | End: 2023-01-30

## 2023-01-30 RX ORDER — PROPOFOL 10 MG/ML
INJECTION, EMULSION INTRAVENOUS AS NEEDED
Status: DISCONTINUED | OUTPATIENT
Start: 2023-01-30 | End: 2023-01-30

## 2023-01-30 RX ORDER — LORAZEPAM 0.5 MG/1
0.5 TABLET ORAL DAILY PRN
Qty: 20 TABLET | Refills: 0 | Status: SHIPPED | OUTPATIENT
Start: 2023-01-30

## 2023-01-30 RX ADMIN — SODIUM CHLORIDE, SODIUM LACTATE, POTASSIUM CHLORIDE, CALCIUM CHLORIDE: 600; 310; 30; 20 INJECTION, SOLUTION INTRAVENOUS at 11:03

## 2023-01-30 RX ADMIN — PROPOFOL 100 MG: 10 INJECTION, EMULSION INTRAVENOUS at 11:23

## 2023-01-30 RX ADMIN — PROPOFOL 50 MG: 10 INJECTION, EMULSION INTRAVENOUS at 11:19

## 2023-01-30 RX ADMIN — PROPOFOL 100 MG: 10 INJECTION, EMULSION INTRAVENOUS at 11:29

## 2023-01-30 RX ADMIN — SODIUM CHLORIDE, SODIUM LACTATE, POTASSIUM CHLORIDE, CALCIUM CHLORIDE: 600; 310; 30; 20 INJECTION, SOLUTION INTRAVENOUS at 11:48

## 2023-01-30 RX ADMIN — PROPOFOL 50 MG: 10 INJECTION, EMULSION INTRAVENOUS at 11:17

## 2023-01-30 RX ADMIN — PROPOFOL 50 MG: 10 INJECTION, EMULSION INTRAVENOUS at 11:43

## 2023-01-30 RX ADMIN — PROPOFOL 100 MG: 10 INJECTION, EMULSION INTRAVENOUS at 11:13

## 2023-01-30 RX ADMIN — PROPOFOL 50 MG: 10 INJECTION, EMULSION INTRAVENOUS at 11:36

## 2023-01-30 NOTE — ANESTHESIA POSTPROCEDURE EVALUATION
April 30, 2018      Jose C Corona  [address]  [place] [address]    Dear Jose C,   Notes Recorded by Jackelyn Avery MD on 4/30/2018 at 11:05 AM  The 10-year ASCVD risk score (Shelbypavithra MONAE Jr, et al., 2013) is: 6.2%    Values used to calculate the score:      Age: 62 years      Sex: Male      Is Non- : No      Diabetic: No      Tobacco smoker: No      Systolic Blood Pressure: 126 mmHg      Is BP treated: No      HDL Cholesterol: 65 mg/dL      Total Cholesterol: 176 mg/dL    Statin not needed.  PSA stable.    Results for orders placed or performed in visit on 04/27/18   Lipid Profile   Result Value Ref Range    Cholesterol 176 <200 mg/dL    Triglycerides 90 <150 mg/dL    HDL Cholesterol 65 >39 mg/dL    LDL Cholesterol Calculated 93 <100 mg/dL    Non HDL Cholesterol 111 <130 mg/dL   PSA, screen   Result Value Ref Range    PSA 3.84 0 - 4 ug/L         Sincerely,        Jackelyn Avery MD           Post-Op Assessment Note    CV Status:  Stable  Pain Score: 0    Pain management: adequate     Mental Status:  Alert and awake   Hydration Status:  Euvolemic   PONV Controlled:  Controlled   Airway Patency:  Patent      Post Op Vitals Reviewed: Yes      Staff: CRNA         No notable events documented      BP   128/61   Temp  98   Pulse  81   Resp   16   SpO2   98

## 2023-01-30 NOTE — H&P
History and Physical - [facility]  Nathalie Kayli 63 y o  female MRN: [id number]                  HPI: Elkin Walters is a 63y o  year old female who presents for colonoscopy due to personal history of tubular adenomas of the colon and family history colon cancer mother in her 62s additionally a paternal uncle  REVIEW OF SYSTEMS: Per the HPI, and otherwise unremarkable      Historical Information   Past Medical History:   Diagnosis Date   • Allergic 2017    Seasonal - weekly shots   • History of oral aphthous ulcers     last assessed 2013   • Other specified menopausal and perimenopausal disorders (CODE)     last assessed 10/25/2013   • Snoring     last assessed 2012     Past Surgical History:   Procedure Laterality Date   • COLONOSCOPY      2009 normal dr iglesias   • ESOPHAGOGASTRODUODENOSCOPY      gastritis dr Yani Yoon 11/10/09   • NASAL SEPTUM SURGERY      last assessed 2017   • SHOULDER ARTHROSCOPY Left 2011    subacronical decompression lysis of adhesant and labrial debridment dr Mishel Farris   • SHOULDER SURGERY Bilateral     for bone spurs in past, last assessed 2016   • TONSILLECTOMY AND ADENOIDECTOMY       Social History   Social History     Substance and Sexual Activity   Alcohol Use Yes   • Alcohol/week: 2 0 standard drinks   • Types: 2 Cans of beer per week    Comment: social, rarely per allscripts     Social History     Substance and Sexual Activity   Drug Use No     Social History     Tobacco Use   Smoking Status Former   • Packs/day: 0 25   • Years: 10 00   • Pack years: 2 50   • Types: Cigarettes   • Start date: 1980   • Quit date: 1989   • Years since quittin 8   Smokeless Tobacco Never   Tobacco Comments    tried as a teenager no cigs since     Family History   Problem Relation Age of Onset   • Colon cancer Mother         dx'd in 46s   • Coronary artery disease Mother    • Hypertension Mother    • COPD Mother    • Cancer Mother Colon   • Other Father         carotid artery stenosis per allscripts   • Hypertension Father    • Panic disorder Sister         without agoraphobia per allcripts   • Alcohol abuse Neg Hx    • Substance Abuse Neg Hx    • Mental illness Neg Hx    • Depression Neg Hx        Meds/Allergies       Current Outpatient Medications:   •  esomeprazole (NexIUM) 20 mg capsule  •  LORazepam (ATIVAN) 0 5 mg tablet  •  montelukast (SINGULAIR) 10 mg tablet  •  rizatriptan (MAXALT-MLT) 10 MG disintegrating tablet  •  sertraline (ZOLOFT) 50 mg tablet    No Known Allergies    Objective     There were no vitals taken for this visit  PHYSICAL EXAM    Gen: NAD  Head: NCAT  CV: RRR  CHEST: Clear  ABD: soft, NT/ND  EXT: no edema      ASSESSMENT/PLAN:  This is a 63y o  year old female here for colonoscopy, and she is stable and optimized for her procedure

## 2023-01-30 NOTE — ANESTHESIA PREPROCEDURE EVALUATION
Procedure:  COLONOSCOPY    Relevant Problems   CARDIO   (+) Migraine without aura      GI/HEPATIC   (+) Gastroesophageal reflux disease with esophagitis      NEURO/PSYCH   (+) Anxiety   (+) Chronic neck pain   (+) History of iron deficiency anemia   (+) Migraine without aura        Physical Exam    Airway    Mallampati score: III  TM Distance: >3 FB  Neck ROM: full     Dental   No notable dental hx     Cardiovascular  Cardiovascular exam normal    Pulmonary  Pulmonary exam normal     Other Findings        Anesthesia Plan  ASA Score- 2     Anesthesia Type- IV sedation with anesthesia with ASA Monitors  Additional Monitors:   Airway Plan:           Plan Factors-Exercise tolerance (METS): >4 METS  Chart reviewed  Patient summary reviewed  Patient is not a current smoker  Induction- intravenous  Postoperative Plan-     Informed Consent- Anesthetic plan and risks discussed with patient

## 2023-02-27 DIAGNOSIS — F41.9 ANXIETY: ICD-10-CM

## 2023-04-27 ENCOUNTER — RA CDI HCC (OUTPATIENT)
Dept: OTHER | Facility: HOSPITAL | Age: 63
End: 2023-04-27

## 2023-04-27 NOTE — PROGRESS NOTES
[facility]  coding opportunities       Chart reviewed, no opportunity found: CHART REVIEWED, NO OPPORTUNITY FOUND        Patients Insurance        Commercial Insurance: [employer]

## 2023-05-04 ENCOUNTER — OFFICE VISIT (OUTPATIENT)
Dept: INTERNAL MEDICINE CLINIC | Facility: CLINIC | Age: 63
End: 2023-05-04

## 2023-05-04 VITALS
HEIGHT: 65 IN | BODY MASS INDEX: 32.99 KG/M2 | OXYGEN SATURATION: 98 % | RESPIRATION RATE: 16 BRPM | HEART RATE: 75 BPM | DIASTOLIC BLOOD PRESSURE: 80 MMHG | WEIGHT: 198 LBS | SYSTOLIC BLOOD PRESSURE: 136 MMHG

## 2023-05-04 DIAGNOSIS — E53.8 B12 DEFICIENCY: ICD-10-CM

## 2023-05-04 DIAGNOSIS — Z63.6 CAREGIVER STRESS: ICD-10-CM

## 2023-05-04 DIAGNOSIS — F41.9 ANXIETY: Primary | ICD-10-CM

## 2023-05-04 DIAGNOSIS — R05.3 PERSISTENT DRY COUGH: ICD-10-CM

## 2023-05-04 RX ORDER — MAGNESIUM 200 MG
1000 TABLET ORAL DAILY
Qty: 90 TABLET | Refills: 1 | Status: SHIPPED | OUTPATIENT
Start: 2023-05-04

## 2023-05-04 RX ORDER — ALBUTEROL SULFATE 90 UG/1
1-2 AEROSOL, METERED RESPIRATORY (INHALATION) EVERY 12 HOURS PRN
Qty: 8 G | Refills: 0 | Status: SHIPPED | OUTPATIENT
Start: 2023-05-04

## 2023-05-04 RX ORDER — LORAZEPAM 0.5 MG/1
0.5 TABLET ORAL DAILY PRN
Qty: 20 TABLET | Refills: 0 | Status: SHIPPED | OUTPATIENT
Start: 2023-05-04

## 2023-05-04 SDOH — SOCIAL STABILITY - SOCIAL INSECURITY: DEPENDENT RELATIVE NEEDING CARE AT HOME: Z63.6

## 2023-05-04 NOTE — PROGRESS NOTES
Name: Missael Tesfaye      : 1960      MRN: [id number]  Encounter Provider: Kimi Harding DO  Encounter Date: 2023   Encounter department: [place] [address]     1  Anxiety  Comments:  taking sertraline daily and lorazepam prn  [place]MP reviewed and last rx filled 2023  c/w lorazepam prn and therapy referral for this and caregiver stress  Orders:  -     LORazepam (ATIVAN) 0 5 mg tablet; Take 1 tablet (0 5 mg total) by mouth daily as needed for anxiety    2  Caregiver stress  Comments:  made an appt with therapist due to symptoms as above  3  Persistent dry cough  Comments:  intermittent, CXR showed clear lungs 2023  exp to tobacco(mother)  c/w montelukast and flonase and add albuterol MDI   if not improving, pulmonary eval  Orders:  -     albuterol (PROVENTIL HFA,VENTOLIN HFA) 90 mcg/act inhaler; Inhale 1-2 puffs every 12 (twelve) hours as needed (cough)    4  B12 deficiency  Comments:  likely related to PPI use  add sublingual or gummy B12 supplement once a day  Orders:  -     Cyanocobalamin (B-12) 1000 MCG SUBL; Place 1 tablet (1,000 mcg total) under the tongue in the morning      BMI Counseling: Body mass index is 32 95 kg/m²  The BMI is above normal  Nutrition recommendations include reducing intake of cholesterol  Exercise recommendations include exercising 3-5 times per week  Rationale for BMI follow-up plan is due to patient being overweight or obese  Depression Screening and Follow-up Plan: Patient was screened for depression during today's encounter  They screened negative with a PHQ-2 score of 0  Nathalie deferred allergy or pulm eval at this time  If cough not improving with albuterol MDI -> will refer or check CT chest     Subjective      HPI     Here for follow up, Nathalie is overall doing well but is having a dry cough still and caregiver stress as she is taking care of her elderly mother    She had a CXR in  of this year for "cough, it is not productive and comes in spurts throughout the day  She is taking montelukast, flonase but this is not helping  She has minimal rhinorrhea and no Chest tightness or wheezing  She is exposed to tobacco as her mother is a chain smoker and feels this could be contributing to her symptoms  We reviewed her most recent BW results today  She takes PPI daily and H2 blocker was in-effective in the past   ROS Otherwise negative, no other complaints  Review of Systems   Constitutional: Negative for fever  HENT: Negative for congestion  Eyes: Negative for visual disturbance  Respiratory: Positive for cough  Negative for shortness of breath  Cardiovascular: Negative for chest pain  Gastrointestinal: Negative for abdominal pain  Endocrine: Negative for polyuria  Genitourinary: Negative for difficulty urinating  Musculoskeletal: Negative for arthralgias  Skin: Negative for rash  Allergic/Immunologic: Positive for environmental allergies  Neurological: Negative for dizziness  Psychiatric/Behavioral: Negative for dysphoric mood  The patient is nervous/anxious (& stressed(caregiver stress))  Current Outpatient Medications on File Prior to Visit   Medication Sig    esomeprazole (NexIUM) 20 mg capsule Take 20 mg by mouth every morning before breakfast    fluticasone (FLONASE) 50 mcg/act nasal spray 2 sprays into each nostril daily    montelukast (SINGULAIR) 10 mg tablet Take 1 tablet (10 mg total) by mouth daily at bedtime    rizatriptan (MAXALT-MLT) 10 MG disintegrating tablet PLACE 1 TABLET ON TONGUE AT ONSET OF MIGRAINE, MAY REPEAT IN 2 HOURS IF NEEDED   MAXIMUM OF 2 TABLETS PER DAY    sertraline (ZOLOFT) 50 mg tablet TAKE 1 TABLET DAILY AT BEDTIME    [DISCONTINUED] LORazepam (ATIVAN) 0 5 mg tablet Take 1 tablet (0 5 mg total) by mouth daily as needed for anxiety       Objective     /80   Pulse 75   Resp 16   Ht 5' 5\" (1 651 m)   Wt 89 8 kg (198 lb)   SpO2 98%  " BMI 32 95 kg/m²     Physical Exam  Vitals reviewed  Constitutional:       General: She is not in acute distress  Appearance: She is normal weight  HENT:      Head: Normocephalic and atraumatic  Right Ear: Tympanic membrane normal       Left Ear: Tympanic membrane normal    Eyes:      Conjunctiva/sclera: Conjunctivae normal    Cardiovascular:      Rate and Rhythm: Normal rate and regular rhythm  Heart sounds: No murmur heard  Pulmonary:      Effort: Pulmonary effort is normal       Breath sounds: No wheezing or rales  Abdominal:      General: Bowel sounds are normal       Palpations: Abdomen is soft  Tenderness: There is no abdominal tenderness  Musculoskeletal:      Cervical back: Neck supple  Right lower leg: No edema  Left lower leg: No edema  Neurological:      Mental Status: She is alert  Mental status is at baseline     Psychiatric:         Mood and Affect: Mood normal          Behavior: Behavior normal        Results for orders placed or performed in visit on 04/21/23   Lipid Panel with Direct LDL reflex   Result Value Ref Range    Total Cholesterol 262 (H) <200 mg/dL    HDL 80 > OR = 50 mg/dL    Triglycerides 138 <150 mg/dL    LDL Calculated 155 (H) mg/dL (calc)    Chol HDLC Ratio 3 3 <5 0 (calc)    Non-HDL Cholesterol 182 (H) <130 mg/dL (calc)   Magnesium   Result Value Ref Range    Magnesium, Serum 2 2 1 5 - 2 5 mg/dL   Comprehensive metabolic panel   Result Value Ref Range    Glucose, Random 99 65 - 99 mg/dL    BUN 19 7 - 25 mg/dL    Creatinine 0 88 0 50 - 1 05 mg/dL    eGFR 75 > OR = 60 mL/min/1 73m2    SL AMB BUN/CREATININE RATIO NOT APPLICABLE 6 - 22 (calc)    Sodium 139 135 - 146 mmol/L    Potassium 4 3 3 5 - 5 3 mmol/L    Chloride 103 98 - 110 mmol/L    CO2 27 20 - 32 mmol/L    Calcium 9 3 8 6 - 10 4 mg/dL    Protein, Total 6 6 6 1 - 8 1 g/dL    Albumin 4 2 3 6 - 5 1 g/dL    Globulin 2 4 1 9 - 3 7 g/dL (calc)    Albumin/Globulin Ratio 1 8 1 0 - 2 5 (calc) TOTAL BILIRUBIN 0 6 0 2 - 1 2 mg/dL    Alkaline Phosphatase 87 37 - 153 U/L    AST 17 10 - 35 U/L    ALT 15 6 - 29 U/L   CBC and differential   Result Value Ref Range    White Blood Cell Count 3 8 3 8 - 10 8 Thousand/uL    Red Blood Cell Count 4 36 3 80 - 5 10 Million/uL    Hemoglobin 13 1 11 7 - 15 5 g/dL    HCT 39 4 35 0 - 45 0 %    MCV 90 4 80 0 - 100 0 fL    MCH 30 0 27 0 - 33 0 pg    MCHC 33 2 32 0 - 36 0 g/dL    RDW 12 9 11 0 - 15 0 %    Platelet Count 313 337 - 400 Thousand/uL    SL AMB MPV 12 2 7 5 - 12 5 fL    Neutrophils (Absolute) 1,927 1,500 - 7,800 cells/uL    Lymphocytes (Absolute) 1,360 850 - 3,900 cells/uL    Monocytes (Absolute) 281 200 - 950 cells/uL    Eosinophils (Absolute) 190 15 - 500 cells/uL    Basophils ABS 42 0 - 200 cells/uL    Neutrophils 50 7 %    Lymphocytes 35 8 %    Monocytes 7 4 %    Eosinophils 5 0 %    Basophils PCT 1 1 %   Vitamin B12   Result Value Ref Range    Vitamin B-12 264 200 - 1,100 pg/mL   Vitamin D 25 hydroxy   Result Value Ref Range    Vitamin D, 25-Hydroxy, Serum 32 30 - 100 ng/mL         Naresh Diamond DO

## 2023-05-04 NOTE — PATIENT INSTRUCTIONS
1  B12 1,000 mcg once a day -> gummies or sublingual  2  Medications re-ordered  3  Albuterol inhaler twice a day as a trial  4    Return in 6 months or sooner if questions

## 2023-05-18 ENCOUNTER — SOCIAL WORK (OUTPATIENT)
Dept: BEHAVIORAL/MENTAL HEALTH CLINIC | Facility: CLINIC | Age: 63
End: 2023-05-18

## 2023-05-18 DIAGNOSIS — F41.9 ANXIETY: Primary | ICD-10-CM

## 2023-05-18 NOTE — PSYCH
"Behavioral Health Psychotherapy Progress Note    Psychotherapy Provided: Individual Psychotherapy     No diagnosis found  Goals addressed in session: Goal 1  Manage mood issues    DATA: Jordin Lockett has become increasingly anxious, overwhelmed and depressed being her mother's primary caretaker along with the matriarch of the family and the burdens and stress her family puts upon her  Feels anxious and overwhelmed most of the time  Difficult to unwind at night  Her sleep and appetite affected  Having increasing struggles with decreased energy, motivation and interest  Prefers to be more isolative at home  Having issues with increased mood lability- irritabiilty and crying spells  Denies any SI  Has very supportive spouse and children  Denies any SI  No HI  During this session, this clinician used the following therapeutic modalities: Engagement Strategies and Supportive Psychotherapy    Substance Abuse was addressed during this session  If the client is diagnosed with a co-occurring substance use disorder, please indicate any changes in the frequency or amount of use: having 1-2 drinks at night to help relax  Stage of change for addressing substance use diagnoses: No substance use/Not applicable    ASSESSMENT:  Nathalie Milan presents with a Anxious and Depressed mood  her affect is Tearful, which is congruent, with her mood and the content of the session  The client has not made progress on their goals  Rigoberto Schilder presents with a minimal risk of suicide, minimal risk of self-harm, and minimal risk of harm to others  For any risk assessment that surpasses a \"low\" rating, a safety plan must be developed  A safety plan was indicated: no  If yes, describe in detail n/a    PLAN: Between sessions, Rigoberto Schilder will establish and maintain more rigid boundaries with family in an effort to limit stress  Signed ARTEMIO for PCP  Given her anxiety and depression, will reach out to PCP to develop a treatment plan   At the " next session, the therapist will use Supportive Psychotherapy to address anxiety and depression  Behavioral Health Treatment Plan and Discharge Planning: Rigoberto Schilder is aware of and agrees to continue to work on their treatment plan  They have identified and are working toward their discharge goals   no    Visit start and stop times: 4:10-4:55    05/18/23

## 2023-05-19 DIAGNOSIS — F41.9 ANXIETY: ICD-10-CM

## 2023-06-02 ENCOUNTER — SOCIAL WORK (OUTPATIENT)
Dept: BEHAVIORAL/MENTAL HEALTH CLINIC | Facility: CLINIC | Age: 63
End: 2023-06-02

## 2023-06-02 DIAGNOSIS — F41.9 ANXIETY: Primary | ICD-10-CM

## 2023-06-02 NOTE — PSYCH
"Behavioral Health Psychotherapy Progress Note    Psychotherapy Provided: Individual Psychotherapy     1  Anxiety            Goals addressed in session: Goal 1 Manage mood issues    DATA:Nathalie continues to struggle to manage stress and anxiety related to situation with her mother  Sisters trying to assist but circumstances dictate that she will remain mother's primary caretaker  Continues to struggle with feeling overwhelmed and fatigues with situation  Does not notice benefit from the increase in Sertraline  Teary when discussing issues with mother  However, less tearful than previous sessions and affect appears to be brighter  Doing the best she can to manage the stress of this difficult situation as it will only worsen as her mother's health continues to fail  Has very supportive , children and friends and this has been helpful  During this session, this clinician used the following therapeutic modalities: Solution-Focused Therapy and Supportive Psychotherapy    Substance Abuse was not addressed during this session  If the client is diagnosed with a co-occurring substance use disorder, please indicate any changes in the frequency or amount of use: n/a  Stage of change for addressing substance use diagnoses: No substance use/Not applicable    ASSESSMENT:  Roland Ramirezeduardo Milan presents with a Anxious mood  her affect is Normal range and intensity and Tearful, which is congruent, with her mood and the content of the session  The client has made progress on their goals  Bobbi Lobo presents with a minimal risk of suicide, minimal risk of self-harm, and minimal risk of harm to others  For any risk assessment that surpasses a \"low\" rating, a safety plan must be developed  A safety plan was indicated: no  If yes, describe in detail n/a    PLAN: Between sessions, Bobbi Lobo will continue to utilize exisiting supports as well as social and creative outlets to offset stress whenever possible   Reviewed " productive outlets to utilize  She agrees to contact me weekly over the next 3-4 weeks to report on her status and response to current dose of Sertraline  Agrees to contact me moving forward if another sessions would be needed  Behavioral Health Treatment Plan and Discharge Planning: Santiago Gloss is aware of and agrees to continue to work on their treatment plan  They have identified and are working toward their discharge goals   no    Visit start and stop times: 12:10-1:00    06/02/23

## 2023-07-12 DIAGNOSIS — F41.9 ANXIETY: ICD-10-CM

## 2023-07-13 RX ORDER — LORAZEPAM 0.5 MG/1
TABLET ORAL
Qty: 20 TABLET | Refills: 0 | Status: SHIPPED | OUTPATIENT
Start: 2023-07-13

## 2023-07-16 DIAGNOSIS — G43.909 MIGRAINE WITHOUT STATUS MIGRAINOSUS, NOT INTRACTABLE, UNSPECIFIED MIGRAINE TYPE: ICD-10-CM

## 2023-07-17 RX ORDER — RIZATRIPTAN BENZOATE 10 MG/1
TABLET, ORALLY DISINTEGRATING ORAL
Qty: 27 TABLET | Refills: 9 | Status: SHIPPED | OUTPATIENT
Start: 2023-07-17

## 2023-07-18 ENCOUNTER — VBI (OUTPATIENT)
Dept: ADMINISTRATIVE | Facility: OTHER | Age: 63
End: 2023-07-18

## 2023-07-18 NOTE — TELEPHONE ENCOUNTER
07/18/23 11:07 AM     [employer] SmartForm used to document caregap status.     Jamison Schofield MA

## 2023-08-24 ENCOUNTER — TELEPHONE (OUTPATIENT)
Dept: INTERNAL MEDICINE CLINIC | Facility: CLINIC | Age: 63
End: 2023-08-24

## 2023-08-31 ENCOUNTER — OFFICE VISIT (OUTPATIENT)
Dept: SLEEP CENTER | Facility: CLINIC | Age: 63
End: 2023-08-31
Payer: COMMERCIAL

## 2023-08-31 VITALS
DIASTOLIC BLOOD PRESSURE: 78 MMHG | HEIGHT: 65 IN | BODY MASS INDEX: 32.95 KG/M2 | HEART RATE: 72 BPM | SYSTOLIC BLOOD PRESSURE: 134 MMHG

## 2023-08-31 DIAGNOSIS — G47.33 OSA ON CPAP: Primary | ICD-10-CM

## 2023-08-31 DIAGNOSIS — Z99.89 OSA ON CPAP: Primary | ICD-10-CM

## 2023-08-31 PROCEDURE — 99204 OFFICE O/P NEW MOD 45 MIN: CPT | Performed by: PHYSICIAN ASSISTANT

## 2023-08-31 NOTE — ASSESSMENT & PLAN NOTE
Patient was diagnosed with severe obstructive sleep apnea on a home sleep study that was performed in May 2023. She began CPAP therapy on 6/27/2023. She is been using it most nights. She has been under some stress due to caring for her ill mother. Overall she is doing quite well with her CPAP. She is using it 77% of the time with a residual AHI of 2.5. She is missing nights when she sleeps over at her mother's house. I advised her to continue to use it nightly. When she is using it, her compliance report looks great. Her overnight pulse oximetry test while using her CPAP showed her oxygen desaturations are well controlled using her CPAP. My only suggestion was to try to use it every night for at least 4 hours per night. The patient will plan on increasing her use and following up with us in 1 year or sooner if she has any concerns.

## 2023-08-31 NOTE — PATIENT INSTRUCTIONS
Your CPAP usage looks great. Your abnormal breathing events and low oxygen levels are well controlled. Try to use your CPAP nightly and at least 4 hours per night. Replace your supplies as recommended. Follow-up with us in 1 year or sooner if you have any concerns    Nursing Support:  When: Monday through Friday 7A-5PM except holidays  Where: Our direct line is [phone]. If you are having a true emergency please call 911. In the event that the line is busy or it is after hours please leave a voice message and we will return your call. Please speak clearly, leaving your full name, birth date, best number to reach you and the reason for your call. Medication refills: We will need the name of the medication, the dosage, the ordering provider, whether you get a 30 or 90 day refill, and the pharmacy name and address. Medications will be ordered by the provider only. Nurses cannot call in prescriptions. Please allow 7 days for medication refills. Physician requested updates: If your provider requested that you call with an update after starting medication, please be ready to provide us the medication and dosage, what time you take your medication, the time you attempt to fall asleep, time you fall asleep, when you wake up, and what time you get out of bed. Sleep Study Results: We will contact you with sleep study results and/or next steps after the physician has reviewed your testing.
No significant past surgical history

## 2023-08-31 NOTE — ASSESSMENT & PLAN NOTE
We discussed that weight loss could be beneficial for overall health and could potentially improve and sometimes even resolve obstructive sleep apnea. The patient states she will continue to work on weight loss and if she is able to achieve a significant weight loss would repeat a home sleep study to reevaluate her sleep apnea.

## 2023-08-31 NOTE — PROGRESS NOTES
Consultation - [address] [facility] [address], 1960, MRN: [id number]    8/31/2023        Reason for Consult / Principal Problem:    Obstructive Sleep Apnea  Obesity       Thank you for the opportunity of participating in the evaluation and care of this patient in the Sleep Clinic at [address] [place] [address]. Subjective:     HPI: Wally Nicole is a 63y.o. year old female who presents today in follow-up of treatment for her obstructive sleep apnea. She states she saw her ENT doctor and mention that she was gasping, choking, and snoring loudly in her sleep especially on her back. Her ENT doctor ordered her a home test and informed her she had severe obstructive sleep apnea and ordered her an auto CPAP to begin use at home. She then completed an overnight pulse oximetry test while using her CPAP. She states she has been using her CPAP nightly without any significant difficulty. She states it has been an adjustment, and she is using it more regularly as time goes on. She states her only challenge in using her CPAP is the fact that her mother has been very sick recently and in and out of the hospital.  She states sometimes she sleeps at her mother's house to help take care of her. When she is not at home she has not been taking her CPAP with her. She presents today to review her test and establish care with our office for her sleep apnea. Comorbid conditions:  GERD, allergic rhinitis, migraine, anxiety    Sleep Study Results:   Home sleep study done on 5/21/2023 showed severe obstructive sleep apnea with a respiratory event index of 35.9. She spent a total of 12 minutes of her study with oxygen saturations less than 88%. She then had an overnight pulse oximetry test with use of her CPAP. Time spent with oxygen saturation less than 88% was 19 seconds.   She did not qualify for oxygen with this test.      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    Sleep Schedule:       Bedtime: 10 to 11 PM on workdays, 11 PM to 12 AM on days off      Latency: Within a few minutes      Wakeup time: 7 to 8 AM daily without an alarm, rarely feels refreshed    Awakenings:       Frequency: 3-4 times per night      Causes: Unknown        Daytime Sleepiness / Inappropriate Sleep:       Most severe: Usually feels sleepy throughout the day      Naps : Does not nap but would have given the opportunity      Inappropriate drowsiness / sleep: Could potentially does with sedentary activities such as reading or watching TV    Snoring: Loud snoring, choking, gasping    Apnea: Witnessed by her     Change in Weight: Gain of approximately 20 pounds over the last year or 2    Restless Leg Syndrome:  No clinical symptoms consistent with this diagnosis     Other Complaints:   No reports of sleep walking, sleep talking, sleep paralysis or hallucinations surrounding sleep. Denies waking up with headaches, bruxism, and dry mouth. Social History:      Caffeine: 9 to 15 ounces of coffee daily     Tobacco:   reports that she quit smoking about 34 years ago. Her smoking use included cigarettes. She started smoking about 43 years ago. She has a 2.50 pack-year smoking history. She has never used smokeless tobacco.     E-cig/Vaping:    E-Cigarette/Vaping   • E-Cigarette Use Never User       E-Cigarette/Vaping Substances         Alcohol:   reports current alcohol use of about 2.0 standard drinks of alcohol per week. Drugs:   reports no history of drug use.        The review of systems and following portions of the patient's history were reviewed and updated as appropriate: allergies, current medications, past family history, past medical history, past social history, past surgical history and problem list.        Objective:       Vitals:    08/31/23 0759   BP: 134/78   BP Location: Left arm   Patient Position: Sitting   Cuff Size: Large   Pulse: 72   Height: 5' 5" (1.651 m)     Body mass index is 32.95 kg/m². Neck Circumference: 16  Keene Sleepiness Scale: Total score: 8      Current Outpatient Medications:   •  Cyanocobalamin (B-12) 1000 MCG SUBL, Place 1 tablet (1,000 mcg total) under the tongue in the morning, Disp: 90 tablet, Rfl: 1  •  esomeprazole (NexIUM) 20 mg capsule, Take 20 mg by mouth every morning before breakfast, Disp: , Rfl:   •  LORazepam (ATIVAN) 0.5 mg tablet, TAKE 1 TABLET BY MOUTH EVERY DAY AS NEEDED FOR ANXIETY, Disp: 20 tablet, Rfl: 0  •  montelukast (SINGULAIR) 10 mg tablet, Take 1 tablet (10 mg total) by mouth daily at bedtime, Disp: 90 tablet, Rfl: 1  •  rizatriptan (MAXALT-MLT) 10 mg disintegrating tablet, PLACE 1 TABLET ON TONGUE AT ONSET OF MIGRAINE, MAY REPEAT IN 2 HOURS IF NEEDED.  MAXIMUM OF 2 TABLETS PER DAY, Disp: 27 tablet, Rfl: 9  •  sertraline (ZOLOFT) 50 mg tablet, Take 1.5 tablets (75 mg total) by mouth daily at bedtime, Disp: 135 tablet, Rfl: 1  •  albuterol (PROVENTIL HFA,VENTOLIN HFA) 90 mcg/act inhaler, Inhale 1-2 puffs every 12 (twelve) hours as needed (cough), Disp: 8 g, Rfl: 0  •  fluticasone (FLONASE) 50 mcg/act nasal spray, 2 sprays into each nostril daily, Disp: 48 g, Rfl: 3    Physical Exam  General Appearance:   Alert, cooperative, no distress, appears stated age, obese     Head:   Normocephalic, without obvious abnormality, atraumatic     Eyes:   PERRL, conjunctiva/corneas clear          Nose:  Nares normal, septum midline, mucosa normal, no drainage or sinus tenderness           Throat:  Lips, teeth and gums normal; tongue normal in size and midline in position; mucosa moist, narrow airway, uvula small, tonsils absent, Mallampati class 3-4     Neck:  Supple, symmetrical, trachea midline, no adenopathy; no thyromegaly noted, no carotid bruit or JVD     Lungs:      Clear to auscultation bilaterally, respirations unlabored     Heart:   Regular rate and rhythm, S1 and S2 normal, no murmur, rub or gallop       Extremities:  Extremities normal, atraumatic, no cyanosis or edema       Skin:  Skin color, texture, turgor normal, no rashes or lesions       Neurologic:  No focal deficits noted. ASSESSMENT / PLAN     1. SHERMAN on CPAP  Assessment & Plan:  Patient was diagnosed with severe obstructive sleep apnea on a home sleep study that was performed in May 2023. She began CPAP therapy on 6/27/2023. She is been using it most nights. She has been under some stress due to caring for her ill mother. Overall she is doing quite well with her CPAP. She is using it 77% of the time with a residual AHI of 2.5. She is missing nights when she sleeps over at her mother's house. I advised her to continue to use it nightly. When she is using it, her compliance report looks great. Her overnight pulse oximetry test while using her CPAP showed her oxygen desaturations are well controlled using her CPAP. My only suggestion was to try to use it every night for at least 4 hours per night. The patient will plan on increasing her use and following up with us in 1 year or sooner if she has any concerns. Orders:  -     PAP DME Resupply/Reorder    2. BMI 32.0-32.9,adult  Assessment & Plan:  We discussed that weight loss could be beneficial for overall health and could potentially improve and sometimes even resolve obstructive sleep apnea. The patient states she will continue to work on weight loss and if she is able to achieve a significant weight loss would repeat a home sleep study to reevaluate her sleep apnea. Today I reviewed the patient's compliance data. She has been able to use the equipment 76% of all days recorded. Average usage was 4 or more hours 56% of all days recorded.   The patient uses the equipment for an average of 4 hours and 7 minutes per night. The estimated AHI is 2.5abnormal breathing events per hour. The patient feels she benefits from the use of the equipment and would like to continue PAP treatment. Response to treatment has been good. A prescription for supplies has been provided to last for the next year. She will continue using this equipment at the settings noted above for the next 12 months. At that time she will return for a routine follow-up evaluation. I have asked the patient to contact the Sleep [address] [place] [address] if any difficulties are encountered prior to that time. Counseling / Coordination of Care    I have spent a total time of 55 minutes on 08/31/23 in caring for this patient including Diagnostic results, Prognosis, Risks and benefits of tx options, Instructions for management, Patient and family education, Importance of tx compliance, Risk factor reductions, Impressions, Counseling / Coordination of care, Documenting in the medical record, Reviewing / ordering tests, medicine, procedures   and Obtaining or reviewing history  . The following instructions have been given to the patient today:    Patient Instructions   Your CPAP usage looks great. Your abnormal breathing events and low oxygen levels are well controlled. Try to use your CPAP nightly and at least 4 hours per night. Replace your supplies as recommended. Follow-up with us in 1 year or sooner if you have any concerns    Nursing Support:  When: Monday through Friday 7A-5PM except holidays  Where: Our direct line is [phone]. If you are having a true emergency please call 911. In the event that the line is busy or it is after hours please leave a voice message and we will return your call. Please speak clearly, leaving your full name, birth date, best number to reach you and the reason for your call. Medication refills:  We will need the name of the medication, the dosage, the ordering provider, whether you

## 2023-09-01 ENCOUNTER — TELEPHONE (OUTPATIENT)
Dept: SLEEP CENTER | Facility: CLINIC | Age: 63
End: 2023-09-01

## 2023-10-06 DIAGNOSIS — F41.9 ANXIETY: ICD-10-CM

## 2023-10-08 RX ORDER — LORAZEPAM 0.5 MG/1
0.5 TABLET ORAL DAILY PRN
Qty: 20 TABLET | Refills: 0 | Status: SHIPPED | OUTPATIENT
Start: 2023-10-08

## 2023-11-13 DIAGNOSIS — F41.9 ANXIETY: ICD-10-CM

## 2023-12-08 ENCOUNTER — VBI (OUTPATIENT)
Dept: ADMINISTRATIVE | Facility: OTHER | Age: 63
End: 2023-12-08

## 2023-12-13 ENCOUNTER — OFFICE VISIT (OUTPATIENT)
Dept: INTERNAL MEDICINE CLINIC | Facility: CLINIC | Age: 63
End: 2023-12-13
Payer: COMMERCIAL

## 2023-12-13 VITALS
HEART RATE: 85 BPM | HEIGHT: 65 IN | SYSTOLIC BLOOD PRESSURE: 122 MMHG | DIASTOLIC BLOOD PRESSURE: 82 MMHG | TEMPERATURE: 98.5 F | BODY MASS INDEX: 33.82 KG/M2 | WEIGHT: 203 LBS | OXYGEN SATURATION: 98 %

## 2023-12-13 DIAGNOSIS — R79.89 LOW VITAMIN D LEVEL: ICD-10-CM

## 2023-12-13 DIAGNOSIS — K21.00 GASTROESOPHAGEAL REFLUX DISEASE WITH ESOPHAGITIS WITHOUT HEMORRHAGE: ICD-10-CM

## 2023-12-13 DIAGNOSIS — F41.9 ANXIETY: ICD-10-CM

## 2023-12-13 DIAGNOSIS — E78.5 DYSLIPIDEMIA: ICD-10-CM

## 2023-12-13 DIAGNOSIS — Z00.00 WELLNESS EXAMINATION: Primary | ICD-10-CM

## 2023-12-13 DIAGNOSIS — G43.009 MIGRAINE WITHOUT AURA AND WITHOUT STATUS MIGRAINOSUS, NOT INTRACTABLE: ICD-10-CM

## 2023-12-13 DIAGNOSIS — E53.8 B12 DEFICIENCY: ICD-10-CM

## 2023-12-13 PROCEDURE — 99496 TRANSJ CARE MGMT HIGH F2F 7D: CPT | Performed by: INTERNAL MEDICINE

## 2023-12-13 NOTE — PROGRESS NOTES
Name: Lisandro Baca      : 1960      MRN: [id number]  Encounter Provider: Aniceto Bhatia DO  Encounter Date: 2023   Encounter department: [address] [place] [address]     1. Wellness examination    2. Anxiety  Comments:  stable but with ongoing caregiver stress. c/w sertraline and lorazepam prn.  c/w therapy as well.  counseling provided today. Orders:  -     Comprehensive metabolic panel; Future; Expected date: 2024  -     CBC and differential; Future; Expected date: 2024    3. B12 deficiency  Comments:  c/w B12 PO  Orders:  -     Vitamin B12; Future; Expected date: 2024    4. Migraine without aura and without status migrainosus, not intractable  Comments:  stable, c/w maxalt as needed    5. Gastroesophageal reflux disease with esophagitis without hemorrhage  Comments:  taking PPI and stable, c/w rx    6. Dyslipidemia  Comments:  stable on last BW, ASCVD risk score is low at 3.8%. c/w lower cholesterol intake from diet and exercising  Orders:  -     Lipid Panel with Direct LDL reflex; Future; Expected date: 2024    7. Low vitamin D level  Comments:  taking vit D OTC, c/w rx & dose  Orders:  -     Vitamin D 25 hydroxy; Future; Expected date: 2024        Depression Screening and Follow-up Plan: Patient was screened for depression during today's encounter. They screened negative with a PHQ-2 score of 0. Subjective      HPI    Here for physical, Nathalie is overall stable but has a lot of ongoing caregiver stress. She is taking her medications but takes care of her mom who has end stage COPD and dementia throughout the week. She has help from her sisters but ends up staying overnight at her mom's home at least once a week. She is seeing a therapist and increased dose of sertraline in recent past which helped. She is coping with the stress for now. She is UTD on mammogram and declines flu vaccine this year.   ROS otherwise negative, no other complaints. Review of Systems   Constitutional:  Negative for fever. HENT:  Negative for congestion. Eyes:  Negative for visual disturbance. Respiratory:  Negative for shortness of breath. Cardiovascular:  Negative for chest pain. Gastrointestinal:  Negative for abdominal pain. Endocrine: Negative for polyuria. Genitourinary:  Negative for difficulty urinating. Musculoskeletal:  Negative for gait problem. Skin:  Negative for rash. Allergic/Immunologic: Negative for immunocompromised state. Neurological:  Negative for dizziness. Psychiatric/Behavioral:  Negative for dysphoric mood (but +caregiver stress). Current Outpatient Medications on File Prior to Visit   Medication Sig    Cyanocobalamin (B-12) 1000 MCG SUBL Place 1 tablet (1,000 mcg total) under the tongue in the morning    esomeprazole (NexIUM) 20 mg capsule Take 20 mg by mouth every morning before breakfast    LORazepam (ATIVAN) 0.5 mg tablet Take 1 tablet (0.5 mg total) by mouth daily as needed for anxiety    montelukast (SINGULAIR) 10 mg tablet Take 1 tablet (10 mg total) by mouth daily at bedtime    rizatriptan (MAXALT-MLT) 10 mg disintegrating tablet PLACE 1 TABLET ON TONGUE AT ONSET OF MIGRAINE, MAY REPEAT IN 2 HOURS IF NEEDED. MAXIMUM OF 2 TABLETS PER DAY    sertraline (ZOLOFT) 50 mg tablet Take 1.5 tablets (75 mg total) by mouth daily at bedtime    [DISCONTINUED] albuterol (PROVENTIL HFA,VENTOLIN HFA) 90 mcg/act inhaler Inhale 1-2 puffs every 12 (twelve) hours as needed (cough)    [DISCONTINUED] fluticasone (FLONASE) 50 mcg/act nasal spray 2 sprays into each nostril daily       Objective     /82 (BP Location: Left arm, Patient Position: Sitting, Cuff Size: Standard)   Pulse 85   Temp 98.5 °F (36.9 °C)   Wt 92.1 kg (203 lb)   SpO2 98%   BMI 33.78 kg/m²     Physical Exam  Vitals reviewed. Constitutional:       General: She is not in acute distress. HENT:      Head: Normocephalic and atraumatic. Right Ear: Tympanic membrane normal.      Left Ear: Tympanic membrane normal.   Eyes:      Conjunctiva/sclera: Conjunctivae normal.   Cardiovascular:      Rate and Rhythm: Normal rate and regular rhythm. Heart sounds: No murmur heard. Pulmonary:      Effort: Pulmonary effort is normal.      Breath sounds: No wheezing or rales. Abdominal:      General: Bowel sounds are normal.      Palpations: Abdomen is soft. Tenderness: There is no abdominal tenderness. Musculoskeletal:      Cervical back: Neck supple. Right lower leg: No edema. Left lower leg: No edema. Neurological:      Mental Status: She is alert. Mental status is at baseline.    Psychiatric:         Mood and Affect: Mood normal.         Behavior: Behavior normal.       Naresh Diamond DO

## 2024-01-03 ENCOUNTER — TELEMEDICINE (OUTPATIENT)
Dept: INTERNAL MEDICINE CLINIC | Facility: CLINIC | Age: 64
End: 2024-01-03
Payer: COMMERCIAL

## 2024-01-03 ENCOUNTER — TELEPHONE (OUTPATIENT)
Dept: INTERNAL MEDICINE CLINIC | Facility: CLINIC | Age: 64
End: 2024-01-03

## 2024-01-03 DIAGNOSIS — U07.1 COVID-19 VIRUS INFECTION: Primary | ICD-10-CM

## 2024-01-03 PROCEDURE — 99213 OFFICE O/P EST LOW 20 MIN: CPT | Performed by: INTERNAL MEDICINE

## 2024-01-03 RX ORDER — NIRMATRELVIR AND RITONAVIR 300-100 MG
3 KIT ORAL 2 TIMES DAILY
Qty: 30 TABLET | Refills: 0 | Status: SHIPPED | OUTPATIENT
Start: 2024-01-03 | End: 2024-01-08

## 2024-01-03 NOTE — TELEPHONE ENCOUNTER
Hi, my name is Nathalie Kelsie. I am a patient of Doctor Pete, birth date [birth year]. I can be reached at [phone]. I just tested positive for COVID first time in four years, and I didn't know if I just ride it out or if there was medicine that's available or what. The next step again, I've been cathie in that I have not yet had it. So again, I can be reached at [phone]. Nathalie Milan, KELSIE, thank you.

## 2024-01-03 NOTE — PROGRESS NOTES
COVID-19 Outpatient Progress Note    Assessment/Plan:    Problem List Items Addressed This Visit    None  Visit Diagnoses       COVID-19 virus infection    -  Primary           Disposition:     Patient with asymptomatic/mild COVID-19: They were recommended to isolate for at least 5 days (day 0 is the day symptoms appeared or the date the specimen was collected for the positive test for people who are asymptomatic). If they are asymptomatic or symptoms are improving with no fevers in the past 24 hours, isolation may be ended followed by 5 days of wearing a high quality mask when around others to minimize risk of infecting others. They should wear a mask through day 10 and a test-based strategy may be used to remove a mask sooner.      Discussed symptom directed medication options with patient.     Nathalie is managing her symptoms at home.  She was prescribed paxlovid.  She was advised not to take maxalt while taking paxlovid.  All questions answered/addressed.    Patient meets criteria for Paxlovid and they have been counseled appropriately regarding risks, benefits, side effects, and alternative treatment options. After discussion, patient agrees to treatment.    Possible side effects of Paxlovid?    Possible side effects of Paxlovid are:  - Liver Problems. Notify us right away if you start to experience loss of appetite, yellowing of your skin and the whites of eyes (jaundice), dark-colored urine, pale colored stools and itchy skin, stomach area (abdominal) pain.  - Resistance to HIV Medicines. If you have untreated HIV infection, Paxlovid may lead to some HIV medicines not working as well in the future.  - Other possible side effects include: altered sense of taste, diarrhea, high blood pressure, or muscle aches.    I have spent a total time of 15 minutes on the day of the encounter for this patient including risks and benefits of treatment options, instructions for management, patient and family education,  impressions, documenting in the medical record, reviewing/ordering tests, medicine, procedures and obtaining or reviewing history.       Encounter provider: Naresh Diamond DO     Provider located at: [facility] [place] [address] [place] [address]  [place] [address]  [phone]     Recent Visits  No visits were found meeting these conditions.  Showing recent visits within past 7 days and meeting all other requirements  Today's Visits  Date Type Provider Dept   01/03/24 Telemedicine Naresh Diamond DO [facility] Internal [facility]   01/03/24 Telephone Gretel Leger [facility]   Showing today's visits and meeting all other requirements  Future Appointments  No visits were found meeting these conditions.  Showing future appointments within next 150 days and meeting all other requirements     This virtual check-in was done via [employer] Embedded and patient was informed that this is a secure, HIPAA-compliant platform. She agrees to proceed.    Patient agrees to participate in a virtual check in via telephone or video visit instead of presenting to the office to address urgent/immediate medical needs. Patient is aware this is a billable service. She acknowledged consent and understanding of privacy and security of the video platform. The patient has agreed to participate and understands they can discontinue the visit at any time.    After connecting through [employer], the patient was identified by name and date of birth. Nathalie Milan was informed that this was a telemedicine visit and that the exam was being conducted confidentially over secure lines. My office door was closed. No one else was in the room. Nathalie Milan acknowledged consent and understanding of privacy and security of the telemedicine visit. I informed the patient that I have reviewed her record in Epic and presented the opportunity for her to ask any questions regarding the visit  "today. The patient agreed to participate.     Verification of patient location:  Patient is located in the following state in which I hold an active license: [place]    Subjective:   Nathalie Milan is a 63 y.o. female who has been screened for COVID-19. Symptom change since last report: unchanged. Patient's symptoms include fever (subjective fever), chills, malaise, nasal congestion, sore throat, cough and myalgias. Patient denies diarrhea.     - Date of symptom onset: 1/2/2024  - Date of positive COVID-19 test: 1/3/2024. Type of test: Home antigen. Patient with typical symptoms of COVID-19 and they attest that they were positive on home rapid antigen testing. Image of positive result is not able to be uploaded into their chart.     COVID-19 vaccination status: Fully vaccinated with booster    Nathalie has been staying home and has isolated themselves in her home. She is taking care to not share personal items and is cleaning all surfaces that are touched often, like counters, tabletops, and doorknobs using household cleaning sprays or wipes. She is wearing a mask when she leaves her room.     Nathalie tested positive for COVID-19.  She is having a moderate to severe URI at this time.  She is unsure where she was exposed to COVID.  She has not had COVID-19 before.  She is isolating at home.  Her last COVID vaccine booster was around October 2022.  She is interested in taking paxlovid and we discussed risk/benefits and some of the AE of the medication.  ROS otherwise negative, no other complaints.    No results found for: \"SARSCOV2\", \"CPNNOEM4YGT\", \"SARSCORONAVI\", \"CORONAVIRUSR\", \"SARSCOVAG\", \"SARSCOVAGH\"    Review of Systems   Constitutional:  Positive for chills and fever (subjective fever).   HENT:  Positive for congestion and sore throat.    Respiratory:  Positive for cough.    Gastrointestinal:  Negative for diarrhea.   Musculoskeletal:  Positive for myalgias.   Allergic/Immunologic: Negative for immunocompromised state. "     Current Outpatient Medications on File Prior to Visit   Medication Sig    Cyanocobalamin (B-12) 1000 MCG SUBL Place 1 tablet (1,000 mcg total) under the tongue in the morning    esomeprazole (NexIUM) 20 mg capsule Take 20 mg by mouth every morning before breakfast    LORazepam (ATIVAN) 0.5 mg tablet Take 1 tablet (0.5 mg total) by mouth daily as needed for anxiety    montelukast (SINGULAIR) 10 mg tablet Take 1 tablet (10 mg total) by mouth daily at bedtime    rizatriptan (MAXALT-MLT) 10 mg disintegrating tablet PLACE 1 TABLET ON TONGUE AT ONSET OF MIGRAINE, MAY REPEAT IN 2 HOURS IF NEEDED. MAXIMUM OF 2 TABLETS PER DAY    sertraline (ZOLOFT) 50 mg tablet Take 1.5 tablets (75 mg total) by mouth daily at bedtime       Objective:    There were no vitals taken for this visit.       Physical Exam  Constitutional:       General: She is not in acute distress.     Appearance: She is ill-appearing.   Eyes:      Conjunctiva/sclera: Conjunctivae normal.   Pulmonary:      Effort: Pulmonary effort is normal. No respiratory distress.   Neurological:      Mental Status: She is alert.   Psychiatric:         Mood and Affect: Mood normal.         Behavior: Behavior normal.       Naresh Diamond DO

## 2024-01-07 DIAGNOSIS — F41.9 ANXIETY: ICD-10-CM

## 2024-01-08 RX ORDER — LORAZEPAM 0.5 MG/1
0.5 TABLET ORAL DAILY PRN
Qty: 20 TABLET | Refills: 0 | Status: SHIPPED | OUTPATIENT
Start: 2024-01-08

## 2024-01-13 ENCOUNTER — PATIENT MESSAGE (OUTPATIENT)
Dept: INTERNAL MEDICINE CLINIC | Facility: CLINIC | Age: 64
End: 2024-01-13

## 2024-01-13 DIAGNOSIS — F41.9 ANXIETY: ICD-10-CM

## 2024-01-23 ENCOUNTER — OFFICE VISIT (OUTPATIENT)
Dept: PULMONOLOGY | Facility: CLINIC | Age: 64
End: 2024-01-23
Payer: COMMERCIAL

## 2024-01-23 ENCOUNTER — TELEPHONE (OUTPATIENT)
Age: 64
End: 2024-01-23

## 2024-01-23 VITALS
DIASTOLIC BLOOD PRESSURE: 98 MMHG | WEIGHT: 203 LBS | OXYGEN SATURATION: 98 % | HEART RATE: 85 BPM | BODY MASS INDEX: 33.78 KG/M2 | SYSTOLIC BLOOD PRESSURE: 150 MMHG | TEMPERATURE: 98.4 F

## 2024-01-23 DIAGNOSIS — J45.40 MODERATE PERSISTENT ALLERGIC ASTHMA: Primary | ICD-10-CM

## 2024-01-23 PROCEDURE — 99204 OFFICE O/P NEW MOD 45 MIN: CPT | Performed by: INTERNAL MEDICINE

## 2024-01-23 RX ORDER — FLUTICASONE FUROATE AND VILANTEROL 100; 25 UG/1; UG/1
1 POWDER RESPIRATORY (INHALATION) DAILY
Qty: 180 BLISTER | Refills: 0 | Status: SHIPPED | OUTPATIENT
Start: 2024-01-23 | End: 2024-01-24

## 2024-01-23 NOTE — PROGRESS NOTES
Pulmonary Consultation   Nathalie Milan 63 y.o. female MRN: [id number]  1/23/2024    Referring Physician or Provider:  Naresh Diamond DO  [address] [place] [address]  [address] [place] [id number]       Chief Complaint:  Intermittent Bronchitis    HPI:    63-year-old female with past medical history of struct of sleep apnea on CPAP, obesity, documented history of eczema presents for evaluation of intermittent shortness of breath, dry cough and audible wheezing.  Patient states symptoms are more prevalent in the summertime which she attributes to exposure to allergens and allergic rhinitis.  She states that when outside of being exposed to cold air she has similar symptoms.  Of note, she was infected with SARS-CoV-2 approximately 3 weeks ago and states that symptoms are slightly worse than baseline since that infection.  She follows with otolaryngology who is prescribed her Singulair which she takes in the summertime with mild relief.  She is a lifelong non-smoker but states that she her parents smoked in close proximity when she was a child.    Exercise Tolerance: Good.  No gross exercise intolerance      Past Medical Hx  Past Medical History:   Diagnosis Date    Allergic 2017    Seasonal - weekly shots    Colon polyp     History of oral aphthous ulcers     last assessed 04/01/2013    Other specified menopausal and perimenopausal disorders (CODE)     last assessed 10/25/2013    Snoring     last assessed 08/29/2012         Past Surgical Hx  Past Surgical History:   Procedure Laterality Date    COLONOSCOPY      jun 2009 normal dr iglesias    ESOPHAGOGASTRODUODENOSCOPY      gastritis dr coello 11/10/09    NASAL SEPTUM SURGERY      last assessed 05/30/2017    SHOULDER ARTHROSCOPY Left 02/05/2011    subacronical decompression lysis of adhesant and labrial debridment dr baker    SHOULDER SURGERY Bilateral     for bone spurs in past, last assessed 03/11/2016    TONSILLECTOMY AND ADENOIDECTOMY         Family Hx  Family History    Problem Relation Age of Onset    Colon cancer Mother         dx'd in 50s    Coronary artery disease Mother     Hypertension Mother     COPD Mother     Cancer Mother         Colon    Other Father         carotid artery stenosis per allscripts    Hypertension Father     Panic disorder Sister         without agoraphobia per allcripts    Alcohol abuse Neg Hx     Substance Abuse Neg Hx     Mental illness Neg Hx     Depression Neg Hx        Occupational History:   Works from home  No known previous inhalation injuries      Social History:   No significant tobacco history, but significant secondhand tobacco exposure  Denies any illicit drug use     Pertinent Meds  No inhalers      ROS:  Constitutional: - Fatigue, - chills, - fever, - weight change.   HEENT: - rhinorrhea, - sneezing, - sore throat.    Respiratory: + Intermittent dry cough, - sputum production, - shortness of breath, + intermittent wheezing.    Cardiovascular: - chest pain,  -palpitations, - leg swelling.   Gastrointestinal: - abdominal pain, - constipation, - diarrhea, - nausea, - vomiting.   Endocrine: - cold intolerance, - heat intolerance.   Genitourinary: - dysuria.   Musculoskeletal: - arthralgias.   Skin:- rash, - wound.   Allergic/Immunologic: - allergies  Neurological: - dizziness, - numbness      Vitals: Blood pressure 150/98, pulse 85, temperature 98.4 °F (36.9 °C), weight 92.1 kg (203 lb), SpO2 98%., Body mass index is 33.78 kg/m².    Physical Exam  GEN  NAD  HEENT  ncat, non icteric, MM moist  NECK  supple, no JVD, no LAD  CV  +s1s2, no mrg, RRR  PULM  CTA BL, no wrr  ABD  soft, nt, + obese, + BS  EXT trace lower extremity pitting edema, no cyanosis, no clubbing  NEURO  Aox3, no focal weakness    Imaging and other studies     I have personally viewed and interpreted the following studies:  Chest x-ray January 18, 2022 shows no gross intraparenchymal or pleural abnormalities    Pulmonary function testing:   No PFTs available for  "interpretation    Assessment:  Intermittent bronchitis  Suspected allergic asthma  Obesity  Obstructive sleep apnea on CPAP    Plan:  I am highly suspicious for asthma in this patient.  Check full pulmonary function test  Check [facility] allergy panel and CBC with differential  Repeat chest x-ray  Trial Breo 100 daily.  Patient counseled on rinsing out mouth and throat after each use  SHERMAN management per sleep medicine    Return visit in 4 to 6 weeks  On next visit we will evaluate symptoms, compliance with Breo, CBC and [facility] allergy panel results, chest x-ray results, PFT results    Note: Portions of the record may have been created with voice recognition software. Occasional wrong word or \"sound a like\" substitutions may have occurred due to the inherent limitations of voice recognition software. Read the chart carefully and recognize, using context, where substitutions have occurred.     Dusty Garcia M.D.  [facility]  Answers submitted by the patient for this visit:  Pulmonology Questionnaire (Submitted on 1/18/2024)  Chief Complaint: Primary symptoms  Do you have a cough?: Yes  Do you experience frequent throat clearing?: Yes  When did you first notice your symptoms?: more than 1 year ago  How often do your symptoms occur?: intermittently  Since you first noticed this problem, how has it changed?: unchanged  Have you had a change in appetite?: No  Do you have chest pain?: No  Do you have shortness of breath that occurs with effort or exertion?: No  Do you have ear congestion?: No  Do you have ear pain?: No  Do you have a fever?: No  Do you have headaches?: No  Do you have heartburn?: No  Do you have fatigue?: No  Do you have muscle pain?: No  Do you have nasal congestion?: No  Do you have shortness of breath when lying flat?: No  Do you have shortness of breath when you wake up?: No  Do you have post-nasal drip?: No  Do you have a runny nose?: No  Do you have " sneezing?: No  Do you have a sore throat?: No  Do you have sweats?: No  Do you have trouble swallowing?: No  Have you experienced weight loss?: No  Risk factors for lung disease: smoking/tobacco exposure

## 2024-01-24 DIAGNOSIS — J45.40 MODERATE PERSISTENT ALLERGIC ASTHMA: Primary | ICD-10-CM

## 2024-01-24 RX ORDER — BUDESONIDE AND FORMOTEROL FUMARATE DIHYDRATE 160; 4.5 UG/1; UG/1
2 AEROSOL RESPIRATORY (INHALATION) 2 TIMES DAILY
Qty: 10.2 G | Refills: 2 | Status: SHIPPED | OUTPATIENT
Start: 2024-01-24 | End: 2024-01-26

## 2024-01-26 DIAGNOSIS — J45.40 MODERATE PERSISTENT ALLERGIC ASTHMA: Primary | ICD-10-CM

## 2024-01-26 RX ORDER — FLUTICASONE FUROATE AND VILANTEROL 200; 25 UG/1; UG/1
1 POWDER RESPIRATORY (INHALATION) DAILY
Qty: 180 BLISTER | Refills: 0 | Status: SHIPPED | OUTPATIENT
Start: 2024-01-26 | End: 2024-04-25

## 2024-01-31 ENCOUNTER — HOSPITAL ENCOUNTER (OUTPATIENT)
Dept: RADIOLOGY | Facility: HOSPITAL | Age: 64
Discharge: HOME | End: 2024-01-31
Payer: COMMERCIAL

## 2024-01-31 DIAGNOSIS — J45.40 MODERATE PERSISTENT ALLERGIC ASTHMA: ICD-10-CM

## 2024-01-31 PROCEDURE — 71046 X-RAY EXAM CHEST 2 VIEWS: CPT

## 2024-02-01 LAB
A ALTERNATA IGE QN: <0.1 KU/L
BASOPHILS # BLD AUTO: 49 CELLS/UL (ref 0–200)
BASOPHILS NFR BLD AUTO: 1.3 %
C HERBARUM IGE QN: <0.1 KU/L
CAT DANDER IGE QN: <0.1 KU/L
COMMON RAGWEED IGE QN: <0.1 KU/L
D FARINAE IGE QN: <0.1 KU/L
DEPRECATED A ALTERNATA IGE RAST QL: 0
DEPRECATED C HERBARUM IGE RAST QL: 0
DEPRECATED CAT DANDER IGE RAST QL: 0
DEPRECATED COMMON RAGWEED IGE RAST QL: 0
DEPRECATED D FARINAE IGE RAST QL: 0
DEPRECATED DOG DANDER IGE RAST QL: 0
DEPRECATED GOOSEFOOT IGE RAST QL: 0
DEPRECATED KENT BLUE GRASS IGE RAST QL: 0
DEPRECATED TIMOTHY IGE RAST QL: 0
DEPRECATED WHITE OAK IGE RAST QL: 0
DOG DANDER IGE QN: <0.1 KU/L
EOSINOPHIL # BLD AUTO: 220 CELLS/UL (ref 15–500)
EOSINOPHIL NFR BLD AUTO: 5.8 %
ERYTHROCYTE [DISTWIDTH] IN BLOOD BY AUTOMATED COUNT: 13 % (ref 11–15)
GOOSEFOOT IGE QN: <0.1 KU/L
HCT VFR BLD AUTO: 42.4 % (ref 35–45)
HGB BLD-MCNC: 14.2 G/DL (ref 11.7–15.5)
KENT BLUE GRASS IGE QN: <0.1 KU/L
LYMPHOCYTES # BLD AUTO: 1258 CELLS/UL (ref 850–3900)
LYMPHOCYTES NFR BLD AUTO: 33.1 %
MCH RBC QN AUTO: 30.3 PG (ref 27–33)
MCHC RBC AUTO-ENTMCNC: 33.5 G/DL (ref 32–36)
MCV RBC AUTO: 90.6 FL (ref 80–100)
MONOCYTES # BLD AUTO: 338 CELLS/UL (ref 200–950)
MONOCYTES NFR BLD AUTO: 8.9 %
NEUTROPHILS # BLD AUTO: 1934 CELLS/UL (ref 1500–7800)
NEUTROPHILS NFR BLD AUTO: 50.9 %
PLATELET # BLD AUTO: 174 THOUSAND/UL (ref 140–400)
PMV BLD REES-ECKER: 12.6 FL (ref 7.5–12.5)
RBC # BLD AUTO: 4.68 MILLION/UL (ref 3.8–5.1)
TIMOTHY IGE QN: <0.1 KU/L
WBC # BLD AUTO: 3.8 THOUSAND/UL (ref 3.8–10.8)
WHITE OAK IGE QN: <0.1 KU/L

## 2024-02-15 ENCOUNTER — HOSPITAL ENCOUNTER (OUTPATIENT)
Dept: PULMONOLOGY | Facility: HOSPITAL | Age: 64
End: 2024-02-15
Payer: COMMERCIAL

## 2024-02-15 DIAGNOSIS — J45.40 MODERATE PERSISTENT ALLERGIC ASTHMA: ICD-10-CM

## 2024-02-15 PROCEDURE — 94060 EVALUATION OF WHEEZING: CPT | Performed by: INTERNAL MEDICINE

## 2024-02-15 PROCEDURE — 94729 DIFFUSING CAPACITY: CPT | Performed by: INTERNAL MEDICINE

## 2024-02-15 PROCEDURE — 94726 PLETHYSMOGRAPHY LUNG VOLUMES: CPT | Performed by: INTERNAL MEDICINE

## 2024-02-15 PROCEDURE — 94761 N-INVAS EAR/PLS OXIMETRY MLT: CPT

## 2024-02-15 PROCEDURE — 94729 DIFFUSING CAPACITY: CPT

## 2024-02-15 PROCEDURE — 94618 PULMONARY STRESS TESTING: CPT | Performed by: INTERNAL MEDICINE

## 2024-02-15 PROCEDURE — 94060 EVALUATION OF WHEEZING: CPT

## 2024-02-15 PROCEDURE — 94726 PLETHYSMOGRAPHY LUNG VOLUMES: CPT

## 2024-02-15 PROCEDURE — 94760 N-INVAS EAR/PLS OXIMETRY 1: CPT

## 2024-02-15 RX ORDER — ALBUTEROL SULFATE 2.5 MG/3ML
2.5 SOLUTION RESPIRATORY (INHALATION) ONCE
Status: COMPLETED | OUTPATIENT
Start: 2024-02-15 | End: 2024-02-15

## 2024-02-15 RX ADMIN — ALBUTEROL SULFATE 2.5 MG: 2.5 SOLUTION RESPIRATORY (INHALATION) at 10:11

## 2024-03-06 ENCOUNTER — OFFICE VISIT (OUTPATIENT)
Dept: PULMONOLOGY | Facility: CLINIC | Age: 64
End: 2024-03-06
Payer: COMMERCIAL

## 2024-03-06 VITALS
SYSTOLIC BLOOD PRESSURE: 142 MMHG | TEMPERATURE: 97.1 F | DIASTOLIC BLOOD PRESSURE: 72 MMHG | HEART RATE: 83 BPM | OXYGEN SATURATION: 96 %

## 2024-03-06 DIAGNOSIS — R05.8 UPPER AIRWAY COUGH SYNDROME: Primary | ICD-10-CM

## 2024-03-06 DIAGNOSIS — J45.30 MILD PERSISTENT ASTHMA WITHOUT COMPLICATION: ICD-10-CM

## 2024-03-06 DIAGNOSIS — J30.2 SEASONAL ALLERGIC RHINITIS, UNSPECIFIED TRIGGER: ICD-10-CM

## 2024-03-06 DIAGNOSIS — E66.9 CLASS 1 OBESITY WITH SERIOUS COMORBIDITY AND BODY MASS INDEX (BMI) OF 33.0 TO 33.9 IN ADULT, UNSPECIFIED OBESITY TYPE: ICD-10-CM

## 2024-03-06 DIAGNOSIS — G47.33 OSA ON CPAP: ICD-10-CM

## 2024-03-06 PROCEDURE — 99214 OFFICE O/P EST MOD 30 MIN: CPT | Performed by: INTERNAL MEDICINE

## 2024-03-06 NOTE — PROGRESS NOTES
Pulmonary Follow Up Note  Nathalie Milan 63 y.o. female MRN: [id number]  3/6/2024      HPI:    Patient continues to have intermittent wet sounding cough.  She states that this is improved since last visit.  She attributes this to addition of Breo.  She uses Breo once a day.  No fevers or chills.  Has made a full functional recovery from infection with SARS-CoV-2.    Exercise Tolerance: Good.  No gross exercise intolerance       Meds:  Breo 200 daily    ROS:  Constitutional: - Fatigue, - chills, - fever, - weight change.   HEENT: - rhinorrhea, - sneezing, - sore throat, + frequent throat clearing.    Respiratory: - cough, - shortness of breath, - wheezing.    Cardiovascular: - chest pain,  -palpitations, - leg swelling.   Gastrointestinal: - abdominal pain, - constipation, - diarrhea, - nausea, - vomiting.   Endocrine: - cold intolerance, - heat intolerance.   Genitourinary: - dysuria.   Musculoskeletal: - arthralgias.   Skin:- rash, - wound.   Allergic/Immunologic: - allergies  Neurological: - dizziness, - numbness        Vitals: Blood pressure 142/72, pulse 83, temperature (!) 97.1 °F (36.2 °C), temperature source Tympanic, SpO2 96%., There is no height or weight on file to calculate BMI.    Physical Exam:  GEN  NAD  HEENT  ncat, non icteric, MM moist  NECK  supple, no JVD, no LAD  CV  +s1s2, no mrg, RRR  PULM  CTA BL, no wrr  ABD  soft, ntnd, + BS  EXT trace lower extremity pitting edema, no cyanosis, no clubbing  NEURO  Aox3, no focal weakness    Imaging and other studies:   I personally viewed and interpreted the following imaging studies:  Chest x-ray 1/31/2024 shows mildly increased interstitial pattern opacification both bases, otherwise no gross intraparenchymal or pleural abnormalities    Pulmonary function testing:   I personally interpreted pulmonary function test from 2/15/2024:  Spirometry is normal  Lung volumes normal  Diffusing capacity is normal  6-minute walk test shows no significant oxygen  "desaturation requiring supplemental oxygen      Assessment:  Upper airway cough syndrome  Allergic asthma  Obesity  Obstructive sleep apnea on CPAP    Plan:  No specific positive triggers on [facility] allergy panel, however patient endorses changes in postnasal drip and cough particularly in the spring season.  Continue Breo 200 daily  Patient will add intranasal corticosteroid (Nasacort, Nasonex, Qnasl, etc.)  Patient will restart Singulair  Patient will restart second-generation antihistamine: Cetirizine.    Return visit in June/July 2024  On next visit we will evaluate symptoms, improvement with cough with combination of medications as above    Note: Portions of the record may have been created with voice recognition software. Occasional wrong word or \"sound a like\" substitutions may have occurred due to the inherent limitations of voice recognition software. Read the chart carefully and recognize, using context, where substitutions have occurred.     Dusty Garcia M.D.  [facility]    Answers submitted by the patient for this visit:  Pulmonology Questionnaire (Submitted on 3/5/2024)  Chief Complaint: Primary symptoms  Do you have a cough?: Yes  Do you experience frequent throat clearing?: Yes  Chronicity: recurrent  When did you first notice your symptoms?: more than 1 year ago  How often do your symptoms occur?: 2 to 4 times per day  Since you first noticed this problem, how has it changed?: waxing and waning  Have you had a change in appetite?: No  Do you have chest pain?: No  Do you have shortness of breath that occurs with effort or exertion?: No  Do you have ear congestion?: No  Do you have ear pain?: No  Do you have a fever?: No  Do you have headaches?: No  Do you have heartburn?: No  Do you have fatigue?: No  Do you have muscle pain?: No  Do you have nasal congestion?: No  Do you have shortness of breath when lying flat?: No  Do you have shortness of breath when you " wake up?: No  Do you have post-nasal drip?: No  Do you have a runny nose?: No  Do you have sneezing?: No  Do you have a sore throat?: No  Do you have sweats?: No  Do you have trouble swallowing?: No  Have you experienced weight loss?: No

## 2024-03-10 DIAGNOSIS — F41.9 ANXIETY: ICD-10-CM

## 2024-03-11 RX ORDER — LORAZEPAM 0.5 MG/1
0.5 TABLET ORAL DAILY PRN
Qty: 20 TABLET | Refills: 0 | Status: SHIPPED | OUTPATIENT
Start: 2024-03-11

## 2024-03-27 DIAGNOSIS — F41.9 ANXIETY: ICD-10-CM

## 2024-04-17 DIAGNOSIS — J45.40 MODERATE PERSISTENT ALLERGIC ASTHMA: ICD-10-CM

## 2024-04-18 RX ORDER — FLUTICASONE FUROATE AND VILANTEROL TRIFENATATE 200; 25 UG/1; UG/1
POWDER RESPIRATORY (INHALATION)
Qty: 180 BLISTER | Refills: 1 | Status: SHIPPED | OUTPATIENT
Start: 2024-04-18

## 2024-04-29 DIAGNOSIS — F41.9 ANXIETY: ICD-10-CM

## 2024-05-01 PROBLEM — R05.8 UPPER AIRWAY COUGH SYNDROME: Status: RESOLVED | Noted: 2024-03-06 | Resolved: 2024-05-01

## 2024-05-01 RX ORDER — LORAZEPAM 0.5 MG/1
0.5 TABLET ORAL DAILY PRN
Qty: 20 TABLET | Refills: 0 | Status: SHIPPED | OUTPATIENT
Start: 2024-05-01

## 2024-06-25 DIAGNOSIS — F41.9 ANXIETY: ICD-10-CM

## 2024-06-25 RX ORDER — LORAZEPAM 0.5 MG/1
0.5 TABLET ORAL DAILY PRN
Qty: 20 TABLET | Refills: 0 | Status: SHIPPED | OUTPATIENT
Start: 2024-06-25

## 2024-07-22 ENCOUNTER — TELEPHONE (OUTPATIENT)
Dept: GASTROENTEROLOGY | Facility: CLINIC | Age: 64
End: 2024-07-22

## 2024-07-22 NOTE — TELEPHONE ENCOUNTER
Pt is due for an EGD for hx of reflux esophagitis (Dr Elder pt).       OA Questions for EGD  Date: [  ]  Screened by: [  ]     Referring Provider: [ Dr Elder pt ]     Pre-Screening: BMI [  ]    Past EGD? If yes - Date: [   ]  Physician/Facility: [   ]  Reason: [   ]     SCHEDULING STAFF: If the patient is over 75 years old, please schedule an office visit.  ·      Does the patient want to see a gastroenterologist prior to their procedure to discuss any GI symptoms? no  ·      Has the patient been hospitalized or had abdominal surgery in the past 6 months? no  ·      Does the patient use supplemental oxygen? no  ·      Does the patient take [Coumadin], [Lovenox], [Plavix], [Eliquis], [Xarelto], or other blood thinning medication? [Yes] [No] no  ·      Has the patient had a stroke, cardiac event, or stent placed in the past year? [Yes] [No] no     SCHEDULING STAFF: If patient answers NO to the above questions, then schedule the procedure. If patient answers YES to any of the above questions, then schedule an office appointment.  ·       If a repeat EGD is belated and patient declines procedure à notify provider.

## 2024-07-22 NOTE — TELEPHONE ENCOUNTER
Scheduled date of EGD(as of today): 9/12/24  Physician performing EGD: Dr Sanabria  Location of EGD: [facility]   Instructions reviewed with patient by: ls via my chart   Clearances: n/a

## 2024-07-31 ENCOUNTER — OFFICE VISIT (OUTPATIENT)
Dept: INTERNAL MEDICINE CLINIC | Facility: CLINIC | Age: 64
End: 2024-07-31
Payer: COMMERCIAL

## 2024-07-31 VITALS
SYSTOLIC BLOOD PRESSURE: 124 MMHG | TEMPERATURE: 98 F | DIASTOLIC BLOOD PRESSURE: 78 MMHG | BODY MASS INDEX: 32.78 KG/M2 | OXYGEN SATURATION: 98 % | WEIGHT: 197 LBS | HEART RATE: 74 BPM

## 2024-07-31 DIAGNOSIS — E78.5 DYSLIPIDEMIA: ICD-10-CM

## 2024-07-31 DIAGNOSIS — J30.2 SEASONAL ALLERGIC RHINITIS, UNSPECIFIED TRIGGER: ICD-10-CM

## 2024-07-31 DIAGNOSIS — J45.30 MILD PERSISTENT ASTHMA WITHOUT COMPLICATION: ICD-10-CM

## 2024-07-31 DIAGNOSIS — Z12.31 ENCOUNTER FOR SCREENING MAMMOGRAM FOR MALIGNANT NEOPLASM OF BREAST: ICD-10-CM

## 2024-07-31 DIAGNOSIS — F41.9 ANXIETY: Primary | ICD-10-CM

## 2024-07-31 PROCEDURE — 3725F SCREEN DEPRESSION PERFORMED: CPT | Performed by: INTERNAL MEDICINE

## 2024-07-31 PROCEDURE — 99214 OFFICE O/P EST MOD 30 MIN: CPT | Performed by: INTERNAL MEDICINE

## 2024-07-31 NOTE — PATIENT INSTRUCTIONS
Prevnar 20 vaccine  Gynecology  Mammogram  Fasting blood work    Patient Education     Pneumococcal Conjugate Vaccine (20-Valent) (dana quezada DEEP Will, LASHONDA hedrick)   Brand Names: [place] Prevnar 20   Brand Names: Aimee Prevnar 20   What is this drug used for?   It is used to prevent infections caused by Streptococcus pneumoniae.  What do I need to tell my doctor BEFORE I take this drug?   If you are allergic to this drug; any part of this drug; or any other drugs, foods, or substances. Tell your doctor about the allergy and what signs you had.  This drug may interact with other drugs or health problems.  Tell your doctor and pharmacist about all of your drugs (prescription or OTC, natural products, vitamins) and health problems. You must check to make sure that it is safe for you to take this drug with all of your drugs and health problems. Do not start, stop, or change the dose of any drug without checking with your doctor.  What are some things I need to know or do while I take this drug?   Tell all of your health care providers that you take this drug. This includes your doctors, nurses, pharmacists, and dentists.  Like all vaccines, this vaccine may not fully protect all people who get it. If you have questions, talk with the doctor.  If you have a weak immune system or take drugs that weaken the immune system, talk with your doctor. This vaccine may not work as well.  Tell your doctor if you are pregnant, plan on getting pregnant, or are breast-feeding. You will need to talk about the benefits and risks to you and the baby.  What are some side effects that I need to call my doctor about right away?   WARNING/CAUTION: Even though it may be rare, some people may have very bad and sometimes deadly side effects when taking a drug. Tell your doctor or get medical help right away if you have any of the following signs or symptoms that may be related to a very bad side effect:  For all patients  taking this drug:   Signs of an allergic reaction, like rash; hives; itching; red, swollen, blistered, or peeling skin with or without fever; wheezing; tightness in the chest or throat; trouble breathing, swallowing, or talking; unusual hoarseness; or swelling of the mouth, face, lips, tongue, or throat.  Children:   If your child is an infant who was born premature, use this drug with care. There is a chance your child could rarely have more side effects.  What are some other side effects of this drug?   All drugs may cause side effects. However, many people have no side effects or only have minor side effects. Call your doctor or get medical help if any of these side effects or any other side effects bother you or do not go away:  For all patients taking this drug:   Pain, redness, or swelling where the shot was given.  Feeling very tired or weak.  Headache.  Muscle or joint pain.  Children:   Feeling irritable.  Feeling sleepy.  Decreased appetite.  Mild fever.  These are not all of the side effects that may occur. If you have questions about side effects, call your doctor. Call your doctor for medical advice about side effects.  You may report side effects to your national health agency.  Report side effects to the FDA/CDC Vaccine Adverse Event Reporting System (VAERS) at https://vaers.hhs.gov/reportevent.html or by calling 1-446.747.6425.  How is this drug best taken?   Use this drug as ordered by your doctor. Read all information given to you. Follow all instructions closely.  It is given as a shot into a muscle.  Children may need more than 1 dose of this vaccine. If the patient is a child, be sure you know how many doses your child has had and how many doses are needed.  What do I do if I miss a dose?   Call your doctor to find out what to do.  How do I store and/or throw out this drug?   If you need to store this drug at home, talk with your doctor, nurse, or pharmacist about how to store it.  General drug  facts   If your symptoms or health problems do not get better or if they become worse, call your doctor.  Do not share your drugs with others and do not take anyone else's drugs.  Keep all drugs in a safe place. Keep all drugs out of the reach of children and pets.  Throw away unused or  drugs. Do not flush down a toilet or pour down a drain unless you are told to do so. Check with your pharmacist if you have questions about the best way to throw out drugs. There may be drug take-back programs in your area.  Some drugs may have another patient information leaflet. If you have any questions about this drug, please talk with your doctor, nurse, pharmacist, or other health care provider.  Some drugs may have another patient information leaflet. Check with your pharmacist. If you have any questions about this drug, please talk with your doctor, nurse, pharmacist, or other health care provider.  If you think there has been an overdose, call your poison control center or get medical care right away. Be ready to tell or show what was taken, how much, and when it happened.  Additional Information   Vaccine Information Statements (VIS) are made by the staff of the Centers for Disease Control and Prevention (CDC). Each VIS gives information to properly inform the adult receiving the vaccine or, in the case of a minor, the child's parent or legal representative about the risks and benefits of each vaccine. Before a doctor vaccinates a child or an adult, the provider is required by the National Childhood Vaccine Injury Act to give a copy of the VIS. You can also get foreign language versions.  https://www.cdc.gov/vaccines/hcp/vis/vis-statements/pcv.html   Consumer Information Use and Disclaimer   This generalized information is a limited summary of diagnosis, treatment, and/or medication information. It is not meant to be comprehensive and should be used as a tool to help the user understand and/or assess potential  diagnostic and treatment options. It does NOT include all information about conditions, treatments, medications, side effects, or risks that may apply to a specific patient. It is not intended to be medical advice or a substitute for the medical advice, diagnosis, or treatment of a health care provider based on the health care provider's examination and assessment of a patient's specific and unique circumstances. Patients must speak with a health care provider for complete information about their health, medical questions, and treatment options, including any risks or benefits regarding use of medications. This information does not endorse any treatments or medications as safe, effective, or approved for treating a specific patient. UpToDate, Inc. and its affiliates disclaim any warranty or liability relating to this information or the use thereof. The use of this information is governed by the Terms of Use, available at https://www.woltersAvocado Entertainmentuwer.com/en/know/clinical-effectiveness-terms.  Last Reviewed Date   2023-05-12  Copyright   © 2024 UpToDate, Inc. and its affiliates and/or licensors. All rights reserved.

## 2024-07-31 NOTE — PROGRESS NOTES
Ambulatory Visit  Name: Nathalie Milan      : 1960      MRN: [id number]  Encounter Provider: Naresh Diamond DO  Encounter Date: 2024   Encounter department: [facility] INTER[facility] MEDICINE    Assessment & Plan   1. Anxiety  Comments:  taking sertraline daily and lorazepam prn.  c/w rx.  counseling provided today for caregiver stress  2. Dyslipidemia  Comments:  stable on last BW and with low ASCVD risk score.  check lipids again with next BW  3. Mild persistent asthma without complication  Comments:  stable since pulmonary eval and with BREO, c/w rx  4. Seasonal allergic rhinitis, unspecified trigger  Comments:  treating with OTC remedies and stable, c/w rx  5. Encounter for screening mammogram for malignant neoplasm of breast  -     Mammo screening bilateral w 3d & cad; Future; Expected date: 10/16/2024      Depression Screening and Follow-up Plan: Patient was screened for depression during today's encounter. They screened negative with a PHQ-2 score of 0.      History of Present Illness     HPI    Here for follow up, Nathalie is overall doing well.  She is taking her medications and taking care of her mother who is on hospice.  She is having caregiver stress but is coping for now.  She is due for BW, mammogram(10/2024) and Gyn eval.  Her daughter is getting  in October.  Her ongoing cough is doing better since taking BREO inhaler as it turned out to be mild asthma.  She is due for prevnar 20 vaccine and will get this at her next office visit.  ROS otherwise negative, no other complaints.    Review of Systems   Constitutional:  Negative for fever.   HENT:  Negative for congestion.    Eyes:  Negative for visual disturbance.   Respiratory:  Negative for shortness of breath.    Cardiovascular:  Negative for chest pain.   Gastrointestinal:  Negative for abdominal pain.   Endocrine: Negative for polyuria.   Genitourinary:  Negative for difficulty urinating.   Musculoskeletal:  Negative for gait  problem.   Skin:  Negative for rash.   Allergic/Immunologic: Negative for immunocompromised state.   Neurological:  Negative for dizziness.   Psychiatric/Behavioral:  Negative for dysphoric mood. The patient is nervous/anxious (& caregiver stress).      Current Outpatient Medications on File Prior to Visit   Medication Sig Dispense Refill    Cyanocobalamin (B-12) 1000 MCG SUBL Place 1 tablet (1,000 mcg total) under the tongue in the morning 90 tablet 1    esomeprazole (NexIUM) 20 mg capsule Take 20 mg by mouth every morning before breakfast      fluticasone-vilanterol (Breo Ellipta) 200-25 mcg/actuation inhaler USE 1 INHALATION DAILY (RINSE MOUTH AFTER USE) 180 blister 1    LORazepam (ATIVAN) 0.5 mg tablet Take 1 tablet (0.5 mg total) by mouth daily as needed for anxiety 20 tablet 0    rizatriptan (MAXALT-MLT) 10 mg disintegrating tablet PLACE 1 TABLET ON TONGUE AT ONSET OF MIGRAINE, MAY REPEAT IN 2 HOURS IF NEEDED. MAXIMUM OF 2 TABLETS PER DAY 27 tablet 9    sertraline (ZOLOFT) 50 mg tablet TAKE 2 TABLETS DAILY AT BEDTIME (DOSE INCREASED) 180 tablet 1    montelukast (SINGULAIR) 10 mg tablet Take 1 tablet (10 mg total) by mouth daily at bedtime (Patient not taking: Reported on 1/23/2024) 90 tablet 1     No current facility-administered medications on file prior to visit.      Objective     /78 (BP Location: Left arm, Patient Position: Sitting, Cuff Size: Standard)   Pulse 74   Temp 98 °F (36.7 °C)   Wt 89.4 kg (197 lb)   SpO2 98%   BMI 32.78 kg/m²     Physical Exam  Vitals reviewed.   Constitutional:       General: She is not in acute distress.     Appearance: Normal appearance.   HENT:      Head: Normocephalic and atraumatic.      Right Ear: Tympanic membrane normal.      Left Ear: Tympanic membrane normal.      Mouth/Throat:      Mouth: Mucous membranes are moist.   Eyes:      Conjunctiva/sclera: Conjunctivae normal.   Cardiovascular:      Rate and Rhythm: Normal rate and regular rhythm.      Heart  sounds: No murmur heard.  Pulmonary:      Effort: Pulmonary effort is normal.      Breath sounds: No wheezing or rales.   Abdominal:      General: Abdomen is flat. Bowel sounds are normal.      Palpations: Abdomen is soft.      Tenderness: There is no abdominal tenderness.   Musculoskeletal:      Cervical back: Neck supple.      Right lower leg: No edema.      Left lower leg: No edema.   Neurological:      Mental Status: She is alert. Mental status is at baseline.   Psychiatric:         Mood and Affect: Mood normal.         Behavior: Behavior normal.      Comments: +stress but coping       Administrative Statements

## 2024-08-06 DIAGNOSIS — F41.9 ANXIETY: ICD-10-CM

## 2024-08-07 RX ORDER — LORAZEPAM 0.5 MG/1
0.5 TABLET ORAL DAILY PRN
Qty: 20 TABLET | Refills: 0 | Status: SHIPPED | OUTPATIENT
Start: 2024-08-07

## 2024-08-19 ENCOUNTER — VBI (OUTPATIENT)
Dept: ADMINISTRATIVE | Facility: OTHER | Age: 64
End: 2024-08-19

## 2024-08-19 ENCOUNTER — TELEPHONE (OUTPATIENT)
Age: 64
End: 2024-08-19

## 2024-08-19 DIAGNOSIS — R05.1 ACUTE COUGH: Primary | ICD-10-CM

## 2024-08-19 RX ORDER — BENZONATATE 200 MG/1
200 CAPSULE ORAL 3 TIMES DAILY PRN
Qty: 20 CAPSULE | Refills: 0 | Status: SHIPPED | OUTPATIENT
Start: 2024-08-19

## 2024-08-19 NOTE — TELEPHONE ENCOUNTER
Nathalie would like to see if Dr. Diamond can call in a script for cough syrup. She said she took covid test and it came back negative.

## 2024-08-19 NOTE — TELEPHONE ENCOUNTER
08/19/24 12:08 PM     Chart reviewed for Pap Smear (HPV) aka Cervical Cancer Screening ; nothing is submitted to the patient's insurance at this time.     Dima Simmons MA   [facility]

## 2024-08-29 ENCOUNTER — ANESTHESIA EVENT (OUTPATIENT)
Dept: ANESTHESIOLOGY | Facility: HOSPITAL | Age: 64
End: 2024-08-29

## 2024-08-29 ENCOUNTER — ANESTHESIA (OUTPATIENT)
Dept: ANESTHESIOLOGY | Facility: HOSPITAL | Age: 64
End: 2024-08-29

## 2024-08-29 NOTE — TELEPHONE ENCOUNTER
Pt called to check if her labs from 12/13/23 were still okay to be completed. Confirmed good until 12/2024

## 2024-09-03 ENCOUNTER — OFFICE VISIT (OUTPATIENT)
Dept: SLEEP CENTER | Facility: CLINIC | Age: 64
End: 2024-09-03
Payer: COMMERCIAL

## 2024-09-03 VITALS
BODY MASS INDEX: 32.82 KG/M2 | DIASTOLIC BLOOD PRESSURE: 61 MMHG | HEART RATE: 77 BPM | HEIGHT: 65 IN | SYSTOLIC BLOOD PRESSURE: 133 MMHG | WEIGHT: 197 LBS

## 2024-09-03 DIAGNOSIS — G47.33 OSA ON CPAP: Primary | ICD-10-CM

## 2024-09-03 PROCEDURE — 99214 OFFICE O/P EST MOD 30 MIN: CPT | Performed by: PHYSICIAN ASSISTANT

## 2024-09-03 NOTE — PATIENT INSTRUCTIONS
Please try to increase your CPAP usage.  It is important to use your CPAP for health benefits as untreated sleep apnea can increase your risk for cardiovascular events.  When you are using your CPAP, your sleep apnea is well-controlled.  As we discussed it is possible with weight loss to improve and sometimes even resolve obstructive sleep apnea.  Will plan to follow-up in 1 year or sooner if you have any concerns    Nursing Support:  When: Monday through Friday 7A-5PM except holidays  Where: Our direct line is [phone].    If you are having a true emergency please call 911.  In the event that the line is busy or it is after hours please leave a voice message and we will return your call.  Please speak clearly, leaving your full name, birth date, best number to reach you and the reason for your call.   Medication refills: We will need the name of the medication, the dosage, the ordering provider, whether you get a 30 or 90 day refill, and the pharmacy name and address.  Medications will be ordered by the provider only.  Nurses cannot call in prescriptions.  Please allow 7 days for medication refills.  Physician requested updates: If your provider requested that you call with an update after starting medication, please be ready to provide us the medication and dosage, what time you take your medication, the time you attempt to fall asleep, time you fall asleep, when you wake up, and what time you get out of bed.  Sleep Study Results: We will contact you with sleep study results and/or next steps after the physician has reviewed your testing.

## 2024-09-03 NOTE — PROGRESS NOTES
Progress Note - [facility]   Nathalie Milan 63 y.o. female   :1960, MRN: [id number]  9/3/2024          Follow Up Evaluation / Problem:     Severe Obstructive Sleep Apnea      Thank you for the opportunity of participating in the evaluation and care of this patient in the Sleep Clinic at [facility].      HPI: Nathalie Milan is a 63 y.o. year old female.  The patient presents for follow up of obstructive sleep apnea.  She completed a home sleep study on 2023 for symptoms of gasping, choking, and loud snoring during her sleep.  Home sleep study confirmed severe obstructive sleep apnea with a respiratory event index of 35.9.  She began use of auto CPAP in May 2023.  She then completed an overnight pulse oximetry test while using her CPAP.  It showed her oxygen desaturations were well-controlled with use of her CPAP.  She presents today to review compliance and effectiveness of treatment with her CPAP.        Current Outpatient Medications:     benzonatate (TESSALON) 200 MG capsule, Take 1 capsule (200 mg total) by mouth 3 (three) times a day as needed for cough, Disp: 20 capsule, Rfl: 0    Cyanocobalamin (B-12) 1000 MCG SUBL, Place 1 tablet (1,000 mcg total) under the tongue in the morning, Disp: 90 tablet, Rfl: 1    esomeprazole (NexIUM) 20 mg capsule, Take 20 mg by mouth every morning before breakfast, Disp: , Rfl:     fluticasone-vilanterol (Breo Ellipta) 200-25 mcg/actuation inhaler, USE 1 INHALATION DAILY (RINSE MOUTH AFTER USE), Disp: 180 blister, Rfl: 1    LORazepam (ATIVAN) 0.5 mg tablet, Take 1 tablet (0.5 mg total) by mouth daily as needed for anxiety, Disp: 20 tablet, Rfl: 0    rizatriptan (MAXALT-MLT) 10 mg disintegrating tablet, PLACE 1 TABLET ON TONGUE AT ONSET OF MIGRAINE, MAY REPEAT IN 2 HOURS IF NEEDED. MAXIMUM OF 2 TABLETS PER DAY, Disp: 27 tablet, Rfl: 9    sertraline (ZOLOFT) 50 mg tablet, TAKE 2 TABLETS DAILY AT BEDTIME (DOSE  "INCREASED), Disp: 180 tablet, Rfl: 1    montelukast (SINGULAIR) 10 mg tablet, Take 1 tablet (10 mg total) by mouth daily at bedtime (Patient not taking: Reported on 2024), Disp: 90 tablet, Rfl: 1    How likely are you to doze off or fall asleep in the following situations, in contrast to feeling just tired?  Sitting and reading: (P) Slight chance of dozing  Watching TV: (P) Slight chance of dozing  Sitting, inactive in a public place (e.g. a theatre or a meeting): (P) Would never doze  As a passenger in a car for an hour without a break: (P) Moderate chance of dozing  Lying down to rest in the afternoon when circumstances permit: (P) Slight chance of dozing  Sitting and talking to someone: (P) Would never doze  Sitting quietly after a lunch without alcohol: (P) Would never doze  In a car, while stopped for a few minutes in traffic: (P) Would never doze  Total score: (P) 5              Vitals:    24 1126   BP: 133/61   BP Location: Left arm   Patient Position: Sitting   Cuff Size: Large   Pulse: 77   Weight: 89.4 kg (197 lb)   Height: 5' 5\" (1.651 m)       Body mass index is 32.78 kg/m².            EPWORTH SLEEPINESS SCORE  Total score: (P) 5      Past History Since Last Sleep Center Visit:     Patient states her mother has been sick for the past year and recently passed away on .  She states after the  services, she developed a severe head cold and had difficulty in using her CPAP.  She states the past month has been rough.  She is trying to get her health back on track and is planning to use her CPAP nightly.  The patient reports that she cleans the equipment appropriately and changes supplies on a regular basis.    The review of systems and following portions of the patient's history were reviewed and updated as appropriate: allergies, current medications, past family history, past medical history, past social history, past surgical history, and problem list.        OBJECTIVE  Equipment " set up date: 5/27/2023, Kitty G3  PAP Pressure: Nasal AutoPAP using a lower limit of 4 cm and an upper limit of 20 cm of water pressure  Type of mask used: full face  DME Provider: [facility]  Denies aerophagia or AM headaches        Physical Exam:     General Appearance:   Alert, cooperative, no distress, appears stated age      Lungs:    Clear to auscultation bilaterally, respirations unlabored     Heart:   Regular rate and rhythm, no murmur                                    ASSESSMENT / PLAN    1. SHERMAN on CPAP  Assessment & Plan:  Patient has a history of severe obstructive sleep apnea.  She states she is attempting to use her CPAP nightly.  Over the past month, she has not had a significant amount of usage due to her mother passing away and having an upper respiratory infection.  Patient demonstrated understanding that it is important for her to use her CPAP due to her history of severe obstructive sleep apnea.  She is aware that it can reduce her cardiovascular event risk.  When she does use her CPAP, her sleep apnea is well-controlled.  I encouraged her to use her CPAP nightly.  Patient will follow-up in 1 year or sooner if she has any concerns.  Orders:  -     PAP DME Resupply/Reorder  2. BMI 32.0-32.9,adult  Assessment & Plan:  We discussed that obesity can put her at higher risk for obstructive sleep apnea.  We discussed with weight loss it is possible to improve and sometimes even resolve obstructive sleep apnea.           Counseling / Coordination of Care  I have spent a total time of 32 minutes on 09/03/24 in caring for this patient including Diagnostic results, Instructions for management, Patient and family education, Importance of tx compliance, Risk factor reductions, Impressions, Counseling / Coordination of care, Documenting in the medical record, Reviewing / ordering tests, medicine, procedures  , and Obtaining or reviewing history  .      Today I reviewed the patient's compliance  data.  she has been able to use the equipment 56% of all days recorded.  Average usage was 4 or more hours 23% of all days recorded.  The patient uses the equipment for an average of 2 hours and 5 minutes per night.  The estimated AHI is 2.5 abnormal breathing events per hour.  The patient feels they benefit from the use of PAP equipment and would like to continue PAP therapy.  Response to treatment has been good.  A prescription for supplies has been provided to last for the next year.    She will continue using this equipment at the settings noted above for the next 12 months.  At that timeshe will then return for a routine follow-up evaluation. I have asked the patient to contact the [facility] Disorders Center if she encounters any difficulties prior to that time.    The following instructions have been given to the patient today:    Patient Instructions   Please try to increase your CPAP usage.  It is important to use your CPAP for health benefits as untreated sleep apnea can increase your risk for cardiovascular events.  When you are using your CPAP, your sleep apnea is well-controlled.  As we discussed it is possible with weight loss to improve and sometimes even resolve obstructive sleep apnea.  Will plan to follow-up in 1 year or sooner if you have any concerns    Nursing Support:  When: Monday through Friday 7A-5PM except holidays  Where: Our direct line is [phone].    If you are having a true emergency please call 911.  In the event that the line is busy or it is after hours please leave a voice message and we will return your call.  Please speak clearly, leaving your full name, birth date, best number to reach you and the reason for your call.   Medication refills: We will need the name of the medication, the dosage, the ordering provider, whether you get a 30 or 90 day refill, and the pharmacy name and address.  Medications will be ordered by the provider only.  Nurses cannot call in prescriptions.   Please allow 7 days for medication refills.  Physician requested updates: If your provider requested that you call with an update after starting medication, please be ready to provide us the medication and dosage, what time you take your medication, the time you attempt to fall asleep, time you fall asleep, when you wake up, and what time you get out of bed.  Sleep Study Results: We will contact you with sleep study results and/or next steps after the physician has reviewed your testing.         Shabana Escobar PA-C  [facility] [place]

## 2024-09-03 NOTE — ASSESSMENT & PLAN NOTE
Patient has a history of severe obstructive sleep apnea.  She states she is attempting to use her CPAP nightly.  Over the past month, she has not had a significant amount of usage due to her mother passing away and having an upper respiratory infection.  Patient demonstrated understanding that it is important for her to use her CPAP due to her history of severe obstructive sleep apnea.  She is aware that it can reduce her cardiovascular event risk.  When she does use her CPAP, her sleep apnea is well-controlled.  I encouraged her to use her CPAP nightly.  Patient will follow-up in 1 year or sooner if she has any concerns.

## 2024-09-03 NOTE — ASSESSMENT & PLAN NOTE
We discussed that obesity can put her at higher risk for obstructive sleep apnea.  We discussed with weight loss it is possible to improve and sometimes even resolve obstructive sleep apnea.

## 2024-09-12 ENCOUNTER — HOSPITAL ENCOUNTER (OUTPATIENT)
Dept: GASTROENTEROLOGY | Facility: AMBULARY SURGERY CENTER | Age: 64
Setting detail: OUTPATIENT SURGERY
End: 2024-09-12
Attending: INTERNAL MEDICINE
Payer: COMMERCIAL

## 2024-09-12 ENCOUNTER — ANESTHESIA (OUTPATIENT)
Dept: GASTROENTEROLOGY | Facility: AMBULARY SURGERY CENTER | Age: 64
End: 2024-09-12
Payer: COMMERCIAL

## 2024-09-12 VITALS
HEIGHT: 65 IN | RESPIRATION RATE: 18 BRPM | TEMPERATURE: 97.4 F | DIASTOLIC BLOOD PRESSURE: 73 MMHG | HEART RATE: 65 BPM | WEIGHT: 195 LBS | OXYGEN SATURATION: 95 % | SYSTOLIC BLOOD PRESSURE: 140 MMHG | BODY MASS INDEX: 32.49 KG/M2

## 2024-09-12 DIAGNOSIS — K21.00 GASTRO-ESOPHAGEAL REFLUX DISEASE WITH ESOPHAGITIS, WITHOUT BLEEDING: ICD-10-CM

## 2024-09-12 PROCEDURE — 88305 TISSUE EXAM BY PATHOLOGIST: CPT | Performed by: PATHOLOGY

## 2024-09-12 PROCEDURE — 43239 EGD BIOPSY SINGLE/MULTIPLE: CPT | Performed by: INTERNAL MEDICINE

## 2024-09-12 RX ORDER — PROPOFOL 10 MG/ML
INJECTION, EMULSION INTRAVENOUS AS NEEDED
Status: DISCONTINUED | OUTPATIENT
Start: 2024-09-12 | End: 2024-09-12

## 2024-09-12 RX ORDER — FENTANYL CITRATE 50 UG/ML
INJECTION, SOLUTION INTRAMUSCULAR; INTRAVENOUS AS NEEDED
Status: DISCONTINUED | OUTPATIENT
Start: 2024-09-12 | End: 2024-09-12

## 2024-09-12 RX ORDER — SODIUM CHLORIDE, SODIUM LACTATE, POTASSIUM CHLORIDE, CALCIUM CHLORIDE 600; 310; 30; 20 MG/100ML; MG/100ML; MG/100ML; MG/100ML
INJECTION, SOLUTION INTRAVENOUS CONTINUOUS PRN
Status: DISCONTINUED | OUTPATIENT
Start: 2024-09-12 | End: 2024-09-12

## 2024-09-12 RX ORDER — LIDOCAINE HYDROCHLORIDE 20 MG/ML
INJECTION, SOLUTION EPIDURAL; INFILTRATION; INTRACAUDAL; PERINEURAL AS NEEDED
Status: DISCONTINUED | OUTPATIENT
Start: 2024-09-12 | End: 2024-09-12

## 2024-09-12 RX ADMIN — PROPOFOL 10 MG: 10 INJECTION, EMULSION INTRAVENOUS at 10:28

## 2024-09-12 RX ADMIN — LIDOCAINE HYDROCHLORIDE 100 MG: 20 INJECTION, SOLUTION EPIDURAL; INFILTRATION; INTRACAUDAL; PERINEURAL at 10:25

## 2024-09-12 RX ADMIN — PROPOFOL 130 MG: 10 INJECTION, EMULSION INTRAVENOUS at 10:25

## 2024-09-12 RX ADMIN — FENTANYL CITRATE 50 MCG: 50 INJECTION INTRAMUSCULAR; INTRAVENOUS at 10:17

## 2024-09-12 RX ADMIN — SODIUM CHLORIDE, SODIUM LACTATE, POTASSIUM CHLORIDE, AND CALCIUM CHLORIDE: .6; .31; .03; .02 INJECTION, SOLUTION INTRAVENOUS at 10:08

## 2024-09-12 NOTE — ANESTHESIA PREPROCEDURE EVALUATION
Procedure:  EGD    Relevant Problems   CARDIO   (+) Migraine without aura      GI/HEPATIC   (+) Gastroesophageal reflux disease with esophagitis      NEURO/PSYCH   (+) Anxiety   (+) Chronic neck pain   (+) Migraine without aura      PULMONARY   (+) Mild persistent asthma without complication   (+) SHERMAN on CPAP        Physical Exam    Airway    Mallampati score: II  TM Distance: >3 FB  Neck ROM: full     Dental   No notable dental hx     Cardiovascular  Rhythm: regular, Rate: normal    Pulmonary   Breath sounds clear to auscultation    Other Findings  post-pubertal.      Anesthesia Plan  ASA Score- 2     Anesthesia Type- IV sedation with anesthesia with ASA Monitors.         Additional Monitors:     Airway Plan:            Plan Factors-Exercise tolerance (METS): >4 METS.    Chart reviewed.   Existing labs reviewed. Patient summary reviewed.    Patient is not a current smoker.      Obstructive sleep apnea risk education given perioperatively.        Induction- intravenous.    Postoperative Plan-         Informed Consent- Anesthetic plan and risks discussed with patient.  I personally reviewed this patient with the CRNA. Discussed and agreed on the Anesthesia Plan with the CRNA..

## 2024-09-12 NOTE — ANESTHESIA POSTPROCEDURE EVALUATION
Post-Op Assessment Note    CV Status:  Stable  Pain Score: 0    Pain management: adequate       Mental Status:  Awake and alert   Hydration Status:  Stable   PONV Controlled:  None   Airway Patency:  Patent and adequate     Post Op Vitals Reviewed: Yes    No anethesia notable event occurred.    Staff: CRNA, Anesthesiologist               BP   132/67   Temp      Pulse  67   Resp   16   SpO2   98

## 2024-09-12 NOTE — H&P
History and Physical - [facility]  Nathalie Milan 64 y.o. female MRN: [id number]        HPI: 64-year-old female with history of GERD.  She had upper endoscopy by Dr. Elder a few years ago when she was noted to have esophagitis and was advised for repeat EGD in a few years.  Takes Nexium OTC regularly.    Historical Information   Past Medical History:   Diagnosis Date    2017    Seasonal - weekly shots    Colon polyp     History of oral aphthous ulcers     last assessed 2013    Other specified menopausal and perimenopausal disorders (CODE)     last assessed 10/25/2013    Sleep apnea, obstructive     Snoring     last assessed 2012     Past Surgical History:   Procedure Laterality Date    COLONOSCOPY      2009 normal dr iglesias    ESOPHAGOGASTRODUODENOSCOPY      gastritis dr elder 11/10/09    NASAL SEPTUM SURGERY      last assessed 2017    SHOULDER ARTHROSCOPY Left 2011    subacronical decompression lysis of adhesant and labrial debridment dr baker    SHOULDER SURGERY Bilateral     for bone spurs in past, last assessed 2016    TONSILLECTOMY AND ADENOIDECTOMY       Social History   Social History     Substance and Sexual Activity   Alcohol Use Yes    Alcohol/week: 2.0 standard drinks of alcohol    Types: 2 Cans of beer per week    Comment: a couple of drinks end of day - most days     Social History     Substance and Sexual Activity   Drug Use No     Social History     Tobacco Use   Smoking Status Former    Current packs/day: 0.00    Average packs/day: 0.3 packs/day for 10.0 years (2.5 ttl pk-yrs)    Types: Cigarettes    Start date: 1980    Quit date: 1989    Years since quittin.4   Smokeless Tobacco Never   Tobacco Comments    tried as a teenager no cigs since     Family History   Problem Relation Age of Onset    Colon cancer Mother         dx'd in 50s    Coronary artery disease Mother     Hypertension Mother     COPD Mother     Cancer  "Mother         Colon    Other Father         carotid artery stenosis per [employer]    Hypertension Father     Panic disorder Sister         without agoraphobia per [employer]    Alcohol abuse Neg Hx     Substance Abuse Neg Hx     Mental illness Neg Hx     Depression Neg Hx        Meds/Allergies     Not in a hospital admission.    No Known Allergies    Objective     Blood pressure 130/66, pulse 67, temperature (!) 96.9 °F (36.1 °C), temperature source Temporal, resp. rate 18, height 5' 5\" (1.651 m), weight 88.5 kg (195 lb), SpO2 97%.    Physical Exam:    Chest- CTA  Heart- RRR  Abdomen- NT/ND  Extremities- No edema    ASSESSMENT:     History of GERD    PLAN:    EGD              "

## 2024-09-16 PROCEDURE — 88305 TISSUE EXAM BY PATHOLOGIST: CPT | Performed by: PATHOLOGY

## 2024-09-20 ENCOUNTER — TELEPHONE (OUTPATIENT)
Dept: SLEEP CENTER | Facility: CLINIC | Age: 64
End: 2024-09-20

## 2024-11-07 DIAGNOSIS — Z00.6 ENCOUNTER FOR EXAMINATION FOR NORMAL COMPARISON OR CONTROL IN CLINICAL RESEARCH PROGRAM: ICD-10-CM

## 2024-11-08 ENCOUNTER — APPOINTMENT (OUTPATIENT)
Dept: LAB | Facility: AMBULARY SURGERY CENTER | Age: 64
End: 2024-11-08

## 2024-11-08 DIAGNOSIS — Z00.6 ENCOUNTER FOR EXAMINATION FOR NORMAL COMPARISON OR CONTROL IN CLINICAL RESEARCH PROGRAM: ICD-10-CM

## 2024-11-08 PROCEDURE — 36415 COLL VENOUS BLD VENIPUNCTURE: CPT

## 2024-11-19 LAB
APOB+LDLR+PCSK9 GENE MUT ANL BLD/T: NOT DETECTED
BRCA1+BRCA2 DEL+DUP + FULL MUT ANL BLD/T: NOT DETECTED
MLH1+MSH2+MSH6+PMS2 GN DEL+DUP+FUL M: NOT DETECTED

## 2024-11-29 DIAGNOSIS — F41.9 ANXIETY: ICD-10-CM

## 2024-12-27 NOTE — PROGRESS NOTES
[facility] [place]    DISCHARGE SUMMARY   Admission Date: 12/26/2024  PCP: Susanne Chance APNP    Discharge Date: 12/27/24  Discharge Provider: Jorge Higgins MD     ADMISSION INFORMATION   Reason For Admission: Right kidney stone [N20.0]  Abdominal pain, unspecified abdominal location [R10.9]    Final Discharge Diagnoses:   Obstructing right distal ureteral stone status post stenting  CHANELL  History of PE    Smoking status: Former Tobacco User  Body mass index is 30.37 kg/m².: Patient is obese with BMI 30-40    Code Status on Discharge: Full Resuscitation  TCM FOLLOW UP     Disposition: Home  Readmission Risk Score (%) =      Medication Changes and Reason:   Preview After Visit Summary  New Medications: Flomax and pain medication    For Discharge Medications: see after visit summary for full list     Tests Pending or Requiring Follow Up:   Tests Pending: Patient has follow-up with urology for stone and stent removal .  Needs follow-up BMP    Needs Further Goals of Care Discussion:   No   Home Health Referral:   No       FOLLOW UP APPOINTMENTS   Ayan Gastelum MD  [address] [facility] [id number]  [phone]    Follow up on 1/3/2025      DISCHARGE INSTRUCTIONS     See [facility]    HOSPITAL COURSE   Admission Narrative   Long is a 67 year old male with a past medical history of hyperlipidemia, and provoked PE who presented to the emergency department due to abdominal pain with vomiting.  In the ED workup significant for right obstructing ureteral stone.  Urology was consulted and recommended a day of straining the urine.  Patient did not pass the stone therefore he went under for a stent procedure this morning without complication.  Pain controlled without IV pain medication postoperatively.  Vital signs stable and patient is medically stable for discharge.  There were no signs of infection on UA therefore not treating with antibiotics.  Patient did have post renal CHANELL that improved.   Assessment/Plan:     Diagnoses and all orders for this visit:    Seasonal allergic rhinitis, unspecified trigger  Comments:  taking singulair with relief, c/w rx and can add 2nd gen AH if needed  Orders:  -     montelukast (SINGULAIR) 10 mg tablet; Take 1 tablet (10 mg total) by mouth daily at bedtime    Gastroesophageal reflux disease with esophagitis  Comments:  taking H2B and seeing GI for ongoing care, c/w rx and f/u GI  Orders:  -     Basic metabolic panel; Future    Anxiety  Comments:  stable, c/w sertraline & at current dose    Encounter for screening mammogram for malignant neoplasm of breast  -     Mammo screening bilateral w 3d & cad; Future    Hypercholesterolemia  Comments:  lost weight/exercising and eating better -> re-check FLP  Orders:  -     Basic metabolic panel; Future  -     Lipid Panel with Direct LDL reflex; Future    Migraine without status migrainosus, not intractable, unspecified migraine type  Comments:  taking maxalt prn with relief, c/w rx  Orders:  -     rizatriptan (MAXALT-MLT) 10 MG disintegrating tablet; Place 1 tablet on tongue at onset of migraine, may repeat in 2 hours if needed  Maximum of 2 tablets per day  -     CBC and differential    History of iron deficiency anemia  Comments:  s/p EGD/colo -> re-check CBC  Orders:  -     CBC and differential    Need for shingles vaccine  Comments:  discussed risk/benefit of vaccine and rx provided to complete at pharmacy  Orders:  -     Zoster Vac Recomb Adjuvanted 50 MCG/0 5ML SUSR; Inject 1 Dose into a muscle once for 1 dose    Class 1 obesity without serious comorbidity with body mass index (BMI) of 30 0 to 30 9 in adult, unspecified obesity type  Comments:  exercising, eating better and lost weight   c/w current plan    Other orders  -     famotidine (PEPCID) 40 MG tablet; Take 40 mg by mouth daily  -     Cancel: sertraline (ZOLOFT) 50 mg tablet;  Take 1 tablet (50 mg total) by mouth daily at bedtime      exam WNL related to tingling of No retinal tears or retinal detachment seen on clinical exam today. Reviewed the signs and symptoms of retinal tear/retinal detachment and the importance of calling for prompt evaluation should there be increasing floaters, new flashing lights, or decreasing peripheral vision in either eye at any time. Observation recommended. Patient was on anticoagulation for provoked PE in the past but has not taken medication for 2 and half years.  Given that PE was provoked anticoagulation was not restarted during this admission.  Patient was established with a new primary care provider and follow-up in [facility].        Consultants:  IP CONSULT TO UROLOGY     Surgical Procedures:  Procedure(s) (LRB):  CYSTOSCOPY, WITH RIGHT URETERAL STENT INSERTION (Right)          DISCHARGE PHYSICAL EXAM     Blood pressure 124/69, pulse 80, temperature 97.7 °F (36.5 °C), temperature source Oral, resp. rate 20, height 6' (1.829 m), weight 101.6 kg (223 lb 14.4 oz), SpO2 95%.       Wt Readings from Last 3 Encounters:   12/26/24 101.6 kg (223 lb 14.4 oz)   10/18/24 103 kg (227 lb 1.2 oz)   04/19/22 103.4 kg (228 lb)     General:  No acute distress.  HEENT: Normocephalic, atraumatic, PERRL, intact extraocular movement.  Neck:  Trachea is midline. No adenopathy.    Cardiovascular:  Regular rate and rhythm, normal S1, S2, no added sounds or murmurs.  Respiratory: Clear to auscultation bilaterally.  No wheezes, rales or rhonchi.  Gastrointestinal:  Soft,  no hepatomegaly or splenomegaly. bowel sounds present. Tender to palpation of the right lower quadrant. No rebound or CVA tenderness.   Neuro:   Alert and Oriented to time, place, person. CN II-XII intact. no focal weakness or sensory deficits.  Psychiatric:   Cooperative.  Appropriate mood & affect.  Normal judgment.  Skin:  Warm and dry without rash.  Extremities: No pitting edema of the lower extremities bilaterally, distal pulses intact.      SIGNIFICANT DIAGNOSTIC STUDIES    Imaging  Micro Summary  Labs Since Admission    Time taken to coordinate discharge care:  Discharge Time: More than 30 Minutes   Discharge instructions, medications and followup appointment were discussed with the patient and after visit summary was given.     Care discussed and coordinated with attending physician, Dr. Jorge Higgins,  skin in/near T12 on right  Advised could be related to tight fitting clothing, exercise or chiropractic manipulation(?)  Pt reports intermittent, transient symptoms and will notify me if worsens    Subjective:      Patient ID: Abebe Hung is a 66 y o  female  HPI    Here for follow up  Overall doing well, lost weight/exercising and trying eat better to lower cholesterol(from 8/2018 -> was not fasting)  Anxiety is doing well, taking sertraline & still taking care of parents  singulair keeping allergies controlled  Due for mammogram, scheduled for April  Hasn't had shingrix vaccine in past, hx of iron def anemia - s/p GI eval and work up and last CBC 1 year ago WNL  Had recent EGD which revealed reflux esophagitis by Dr Rony Rangel and is taking H2B daily  Still having some neck stiffness and discomfort but seeing Luisana Gordillo for treatment with some relief  Having intermittent RUQ tingling, below 12th rib -> lasts for 1 minute at a time but no pain or nausea and symptoms come and go  No other concerns or complaints  Past Medical History:   Diagnosis Date    Daytime sleepiness     resolved 05/30/2017    History of oral aphthous ulcers     last assessed 04/01/2013    Migraine     last assessed 11/28/2016    Other specified menopausal and perimenopausal disorders (CODE)     last assessed 10/25/2013    Snoring     last assessed 08/29/2012     Vitals:    03/11/19 0752   BP: 118/80   Pulse: 73   Resp: 18   Temp: 98 3 °F (36 8 °C)   SpO2: 98%   Weight: 83 kg (183 lb)   Height: 5' 5" (1 651 m)     Body mass index is 30 45 kg/m²  Current Outpatient Medications:     famotidine (PEPCID) 40 MG tablet, Take 40 mg by mouth daily, Disp: , Rfl: 2    montelukast (SINGULAIR) 10 mg tablet, Take 1 tablet (10 mg total) by mouth daily at bedtime, Disp: 90 tablet, Rfl: 1    rizatriptan (MAXALT-MLT) 10 MG disintegrating tablet, Place 1 tablet on tongue at onset of migraine, may repeat in 2 hours if needed  MD.    RAVI Pino       Patient seen, examined, and discussed with patient. I have personally repeated the pertinent parts of the history and physical examination. I have personally reviewed patient's vitals, lab findings, and radiologic images. Plan as noted has been formulated by me and discussed with the PA, as well as any consultants necessary. I agree with the note as written with changes made within the note and exceptions, if any, noted below.  As above.  Right ureteral stone causing right lower quadrant abdominal pain.  Unable to pass on own.  Urology consulted and stent placed with improvement of pain.  Will need to have renal function checked at follow-up.  No need for antibiotics.  Jorge Higgins MD              Maximum of 2 tablets per day, Disp: 27 tablet, Rfl: 1    sertraline (ZOLOFT) 50 mg tablet, TAKE 1 TABLET DAILY AT BEDTIME (DOSE CHANGE), Disp: 90 tablet, Rfl: 1    Zoster Vac Recomb Adjuvanted 50 MCG/0 5ML SUSR, Inject 1 Dose into a muscle once for 1 dose, Disp: 1 each, Rfl: 1  No Known Allergies      Review of Systems   Constitutional: Negative for fever  HENT: Negative for congestion  Eyes: Negative for visual disturbance  Respiratory: Negative for shortness of breath  Cardiovascular: Negative for chest pain  Gastrointestinal: Negative for abdominal pain  Genitourinary: Negative for dysuria  Musculoskeletal: Positive for neck pain and neck stiffness  Skin: Negative for rash  Allergic/Immunologic: Positive for environmental allergies  Neurological: Negative for headaches  Psychiatric/Behavioral: Negative for dysphoric mood  Objective:      /80   Pulse 73   Temp 98 3 °F (36 8 °C)   Resp 18   Ht 5' 5" (1 651 m)   Wt 83 kg (183 lb)   SpO2 98%   BMI 30 45 kg/m²          Physical Exam   Constitutional: She appears well-developed and well-nourished  HENT:   Head: Normocephalic and atraumatic  Eyes: Pupils are equal, round, and reactive to light  Cardiovascular: Normal rate, regular rhythm and normal heart sounds  No murmur heard  Pulmonary/Chest: Effort normal and breath sounds normal  She has no wheezes  She has no rales  Abdominal: Soft  Bowel sounds are normal  There is no tenderness  There is no rigidity, no rebound, no guarding and negative Cm's sign  No tingling today and no reproducible discomfort on exam   Musculoskeletal: She exhibits no edema  Neurological: She is alert  Psychiatric: She has a normal mood and affect  Her behavior is normal    Vitals reviewed

## 2025-01-07 ENCOUNTER — OFFICE VISIT (OUTPATIENT)
Dept: URGENT CARE | Age: 65
End: 2025-01-07
Payer: COMMERCIAL

## 2025-01-07 VITALS
RESPIRATION RATE: 18 BRPM | DIASTOLIC BLOOD PRESSURE: 80 MMHG | BODY MASS INDEX: 33.12 KG/M2 | SYSTOLIC BLOOD PRESSURE: 138 MMHG | WEIGHT: 199 LBS | HEART RATE: 77 BPM | TEMPERATURE: 98.2 F

## 2025-01-07 DIAGNOSIS — J06.9 ACUTE URI: Primary | ICD-10-CM

## 2025-01-07 PROCEDURE — 99203 OFFICE O/P NEW LOW 30 MIN: CPT | Performed by: PHYSICIAN ASSISTANT

## 2025-01-07 RX ORDER — METHYLPREDNISOLONE 4 MG/1
TABLET ORAL
Qty: 1 EACH | Refills: 0 | Status: SHIPPED | OUTPATIENT
Start: 2025-01-07

## 2025-01-07 NOTE — PROGRESS NOTES
[facility]        NAME: Nathalie Milan is a 64 y.o. female  : 1960    MRN: [id number]  DATE: 2025  TIME: 11:18 AM    Assessment and Plan   Acute URI [J06.9]  1. Acute URI  amoxicillin-clavulanate (AUGMENTIN) 875-125 mg per tablet    methylPREDNISolone 4 MG tablet therapy pack            Patient Instructions   Patient was educated on URI.  Patient was educated on antibiotics and steroids.  Patient was told to eat on antibiotics.  Patient was told to not take any over-the-counter anti-inflammatories while on the steroids.  If cough persist recommend chest x-ray.  Any chest pain or shortness of breath go to ED.  If symptoms persist follow-up PCP.    Follow up with PCP in 3-5 days.  Proceed to  ER if symptoms worsen.    If tests have been performed at [facility], our office will contact you with results if changes need to be made to the care plan discussed with you at the visit.  [facility]    Chief Complaint     Chief Complaint   Patient presents with    Cold Like Symptoms     Cough and nasal congestion x 1 week          History of Present Illness       Patient is a 64-year-old female who presents today to the urgent care complaining of congestion and productive cough that has occurred for 1 week.  Denies any history of diabetes.  Patient reports she had seen a pulmonologist who has diagnosed her with mild asthma.  Denies any allergies to medications.      Cough  The current episode started 1 to 4 weeks ago. The problem has been waxing and waning. The problem occurs hourly. The cough is Productive of sputum. Associated symptoms include nasal congestion, a sore throat and wheezing. Pertinent negatives include no chest pain, chills, ear congestion, ear pain, fever, headaches, heartburn, hemoptysis, myalgias, postnasal drip, rash, rhinorrhea, shortness of breath, sweats or weight loss.       Review of Systems   Review of Systems   Constitutional:   Negative for chills, fever and weight loss.   HENT:  Positive for sore throat. Negative for ear pain, postnasal drip and rhinorrhea.    Respiratory:  Positive for cough and wheezing. Negative for hemoptysis and shortness of breath.    Cardiovascular:  Negative for chest pain.   Gastrointestinal:  Negative for heartburn.   Musculoskeletal:  Negative for myalgias.   Skin:  Negative for rash.   Neurological:  Negative for headaches.         Current Medications       Current Outpatient Medications:     amoxicillin-clavulanate (AUGMENTIN) 875-125 mg per tablet, Take 1 tablet by mouth every 12 (twelve) hours for 7 days, Disp: 14 tablet, Rfl: 0    esomeprazole (NexIUM) 20 mg capsule, Take 20 mg by mouth every morning before breakfast, Disp: , Rfl:     fluticasone-vilanterol (Breo Ellipta) 200-25 mcg/actuation inhaler, USE 1 INHALATION DAILY (RINSE MOUTH AFTER USE), Disp: 180 blister, Rfl: 1    LORazepam (ATIVAN) 0.5 mg tablet, Take 1 tablet (0.5 mg total) by mouth daily as needed for anxiety, Disp: 20 tablet, Rfl: 0    methylPREDNISolone 4 MG tablet therapy pack, Use as directed on package, Disp: 1 each, Rfl: 0    montelukast (SINGULAIR) 10 mg tablet, Take 1 tablet (10 mg total) by mouth daily at bedtime, Disp: 90 tablet, Rfl: 1    rizatriptan (MAXALT-MLT) 10 mg disintegrating tablet, PLACE 1 TABLET ON TONGUE AT ONSET OF MIGRAINE, MAY REPEAT IN 2 HOURS IF NEEDED. MAXIMUM OF 2 TABLETS PER DAY, Disp: 27 tablet, Rfl: 9    sertraline (ZOLOFT) 50 mg tablet, Take 2 tablets (100 mg total) by mouth daily at bedtime, Disp: 180 tablet, Rfl: 1    benzonatate (TESSALON) 200 MG capsule, Take 1 capsule (200 mg total) by mouth 3 (three) times a day as needed for cough, Disp: 20 capsule, Rfl: 0    Cyanocobalamin (B-12) 1000 MCG SUBL, Place 1 tablet (1,000 mcg total) under the tongue in the morning, Disp: 90 tablet, Rfl: 1    Current Allergies     Allergies as of 01/07/2025    (No Known Allergies)            The following portions of the  patient's history were reviewed and updated as appropriate: allergies, current medications, past family history, past medical history, past social history, past surgical history and problem list.     Past Medical History:   Diagnosis Date    Allergic 2017    Seasonal - weekly shots    Colon polyp     History of oral aphthous ulcers     last assessed 04/01/2013    Other specified menopausal and perimenopausal disorders (CODE)     last assessed 10/25/2013    Sleep apnea, obstructive     Snoring     last assessed 08/29/2012       Past Surgical History:   Procedure Laterality Date    COLONOSCOPY      jun 2009 normal dr iglesias    ESOPHAGOGASTRODUODENOSCOPY      gastritis dr coello 11/10/09    NASAL SEPTUM SURGERY      last assessed 05/30/2017    SHOULDER ARTHROSCOPY Left 02/05/2011    subacronical decompression lysis of adhesant and labrial debridment dr baker    SHOULDER SURGERY Bilateral     for bone spurs in past, last assessed 03/11/2016    TONSILLECTOMY AND ADENOIDECTOMY         Family History   Problem Relation Age of Onset    Colon cancer Mother         dx'd in 50s    Coronary artery disease Mother     Hypertension Mother     COPD Mother     Cancer Mother         Colon    Other Father         carotid artery stenosis per [employer]    Hypertension Father     Panic disorder Sister         without agoraphobia per [employer]cripts    Alcohol abuse Neg Hx     Substance Abuse Neg Hx     Mental illness Neg Hx     Depression Neg Hx          Medications have been verified.        Objective   /80 (BP Location: Left arm, Patient Position: Sitting, Cuff Size: Adult)   Pulse 77   Temp 98.2 °F (36.8 °C) (Tympanic)   Resp 18   Wt 90.3 kg (199 lb)   BMI 33.12 kg/m²   No LMP recorded. Patient is postmenopausal.       Physical Exam     Physical Exam  Vitals and nursing note reviewed.   Constitutional:       Appearance: Normal appearance.   HENT:      Head: Normocephalic.      Comments: No pressure over frontal or  maxillary sinuses.     Right Ear: Tympanic membrane, ear canal and external ear normal.      Left Ear: Tympanic membrane, ear canal and external ear normal.      Mouth/Throat:      Mouth: Mucous membranes are moist.   Eyes:      Pupils: Pupils are equal, round, and reactive to light.   Cardiovascular:      Rate and Rhythm: Normal rate and regular rhythm.      Heart sounds: Normal heart sounds.   Pulmonary:      Breath sounds: Normal breath sounds. No wheezing.   Neurological:      General: No focal deficit present.      Mental Status: She is alert and oriented to person, place, and time.   Psychiatric:         Mood and Affect: Mood normal.         Behavior: Behavior normal.

## 2025-01-07 NOTE — PATIENT INSTRUCTIONS
Patient was educated on URI.  Patient was educated on antibiotics and steroids.  Patient was told to eat on antibiotics.  Patient was told to not take any over-the-counter anti-inflammatories while on the steroids.  If cough persist recommend chest x-ray.  Any chest pain or shortness of breath go to ED.  If symptoms persist follow-up PCP.

## 2025-01-30 ENCOUNTER — OFFICE VISIT (OUTPATIENT)
Dept: FAMILY MEDICINE CLINIC | Facility: CLINIC | Age: 65
End: 2025-01-30
Payer: COMMERCIAL

## 2025-01-30 VITALS
HEIGHT: 65 IN | BODY MASS INDEX: 33.62 KG/M2 | WEIGHT: 201.8 LBS | HEART RATE: 85 BPM | SYSTOLIC BLOOD PRESSURE: 134 MMHG | TEMPERATURE: 98.5 F | DIASTOLIC BLOOD PRESSURE: 84 MMHG | OXYGEN SATURATION: 98 %

## 2025-01-30 DIAGNOSIS — F41.9 ANXIETY: ICD-10-CM

## 2025-01-30 DIAGNOSIS — E66.811 CLASS 1 OBESITY WITH SERIOUS COMORBIDITY AND BODY MASS INDEX (BMI) OF 33.0 TO 33.9 IN ADULT, UNSPECIFIED OBESITY TYPE: ICD-10-CM

## 2025-01-30 DIAGNOSIS — K21.00 GASTROESOPHAGEAL REFLUX DISEASE WITH ESOPHAGITIS WITHOUT HEMORRHAGE: ICD-10-CM

## 2025-01-30 DIAGNOSIS — E78.5 DYSLIPIDEMIA: ICD-10-CM

## 2025-01-30 DIAGNOSIS — Z86.2 HISTORY OF IRON DEFICIENCY ANEMIA: ICD-10-CM

## 2025-01-30 DIAGNOSIS — R79.89 LOW VITAMIN D LEVEL: ICD-10-CM

## 2025-01-30 DIAGNOSIS — Z00.00 ANNUAL PHYSICAL EXAM: Primary | ICD-10-CM

## 2025-01-30 DIAGNOSIS — Z13.6 SCREENING FOR CARDIOVASCULAR CONDITION: ICD-10-CM

## 2025-01-30 DIAGNOSIS — J45.30 MILD PERSISTENT ASTHMA WITHOUT COMPLICATION: ICD-10-CM

## 2025-01-30 DIAGNOSIS — Z13.1 SCREENING FOR DIABETES MELLITUS: ICD-10-CM

## 2025-01-30 PROCEDURE — 99396 PREV VISIT EST AGE 40-64: CPT

## 2025-01-30 PROCEDURE — 99214 OFFICE O/P EST MOD 30 MIN: CPT

## 2025-01-30 RX ORDER — MULTIVITAMIN
CAPSULE ORAL
COMMUNITY

## 2025-01-30 RX ORDER — FLUCONAZOLE 150 MG/1
TABLET ORAL
COMMUNITY
Start: 2024-09-10

## 2025-01-30 NOTE — PROGRESS NOTES
Adult Annual Physical  Name: Nathalie Milan      : 1960      MRN: [id number]  Encounter Provider: BUCK Trinidad  Encounter Date: 2025   Encounter department: [facility]    Assessment & Plan  Annual physical exam  CPE done, routine labs ordered. Pt had normal mammogram 24, due for repeat colonoscopy 2025, foll with [facility] and UTD with cervical cancer screening. Pt declined pneumonia, flu, COVID vaccines. Employment physical form completed   Orders:  •  CBC and differential    History of iron deficiency anemia  Check CBC pt no longer taking supplements  Orders:  •  CBC and differential    Dyslipidemia  Check lipid panel counseled on low fat diet  Orders:  •  Lipid Panel with Direct LDL reflex    Screening for diabetes mellitus    Orders:  •  Hemoglobin A1C    Screening for cardiovascular condition    Orders:  •  Comprehensive metabolic panel    Anxiety  Pt reports improving symptoms has reduced sertraline from 100 mg to 50 mg.       Gastroesophageal reflux disease with esophagitis without hemorrhage  Stable symptoms on esomeprazole 20 mg qd.       Mild persistent asthma without complication  Stable pt will be resuming Breo Ellipta inhaler use follows with pulmonology.       Low vitamin D level  Pt no longer taking supplements, denies fatigue check levels.  Orders:  •  Vitamin D 25 hydroxy    Class 1 obesity with serious comorbidity and body mass index (BMI) of 33.0 to 33.9 in adult, unspecified obesity type  Counseled on healthier eating habits and regular exercise.         Immunizations and preventive care screenings were discussed with patient today. Appropriate education was printed on patient's after visit summary.    Counseling:  Alcohol/drug use: discussed moderation in alcohol intake, the recommendations for healthy alcohol use, and avoidance of illicit drug use.  Sexual health: discussed sexually transmitted diseases, partner selection, use of  condoms, avoidance of unintended pregnancy, and contraceptive alternatives.  Exercise: the importance of regular exercise/physical activity was discussed. Recommend exercise 3-5 times per week for at least 30 minutes.          History of Present Illness     Adult Annual Physical:  Patient presents for annual physical.     Diet and Physical Activity:  - Diet/Nutrition: well balanced diet and limited junk food.  - Exercise: no formal exercise.    General Health:  - Sleep: sleeps well and 4-6 hours of sleep on average.  - Hearing: normal hearing bilateral ears.  - Vision: goes for regular eye exams and wears glasses and contacts.  - Dental: regular dental visits and brushes teeth twice daily.    /GYN Health:  - Follows with GYN: yes.   - Menopause: postmenopausal.   - History of STDs: no    Review of Systems   Constitutional:  Negative for activity change, appetite change, chills, diaphoresis, fatigue and fever.   HENT:  Negative for congestion, drooling, ear pain, hearing loss, nosebleeds, postnasal drip, rhinorrhea, sinus pressure, sinus pain, sore throat, trouble swallowing and voice change.    Eyes:  Negative for photophobia, pain, discharge, redness and visual disturbance.   Respiratory:  Negative for cough, chest tightness, shortness of breath and wheezing.    Cardiovascular:  Negative for chest pain, palpitations and leg swelling.   Gastrointestinal:  Negative for abdominal distention, abdominal pain, blood in stool, constipation, diarrhea, nausea, rectal pain and vomiting.   Endocrine: Negative for cold intolerance, heat intolerance, polydipsia, polyphagia and polyuria.   Genitourinary:  Negative for decreased urine volume, difficulty urinating, dysuria, flank pain, genital sores, hematuria, menstrual problem, pelvic pain, urgency, vaginal discharge and vaginal pain.   Musculoskeletal:  Negative for arthralgias, back pain, gait problem, myalgias, neck pain and neck stiffness.   Skin:  Negative for color  change, rash and wound.   Allergic/Immunologic: Negative for environmental allergies, food allergies and immunocompromised state.   Neurological:  Negative for dizziness, tremors, seizures, syncope, facial asymmetry, weakness, light-headedness and numbness.   Hematological:  Negative for adenopathy.   Psychiatric/Behavioral:  Negative for agitation, behavioral problems, confusion, decreased concentration, dysphoric mood, hallucinations, self-injury, sleep disturbance and suicidal ideas. The patient is not nervous/anxious and is not hyperactive.    All other systems reviewed and are negative.    Current Outpatient Medications on File Prior to Visit   Medication Sig Dispense Refill   • esomeprazole (NexIUM) 20 mg capsule Take 20 mg by mouth every morning before breakfast     • fluconazole (DIFLUCAN) 150 mg tablet Take one tablet today, repeat dose in 72 hours     • fluticasone-vilanterol (Breo Ellipta) 200-25 mcg/actuation inhaler USE 1 INHALATION DAILY (RINSE MOUTH AFTER USE) 180 blister 1   • LORazepam (ATIVAN) 0.5 mg tablet Take 1 tablet (0.5 mg total) by mouth daily as needed for anxiety 20 tablet 0   • montelukast (SINGULAIR) 10 mg tablet Take 1 tablet (10 mg total) by mouth daily at bedtime 90 tablet 1   • rizatriptan (MAXALT-MLT) 10 mg disintegrating tablet PLACE 1 TABLET ON TONGUE AT ONSET OF MIGRAINE, MAY REPEAT IN 2 HOURS IF NEEDED. MAXIMUM OF 2 TABLETS PER DAY 27 tablet 9   • sertraline (ZOLOFT) 50 mg tablet Take 2 tablets (100 mg total) by mouth daily at bedtime (Patient taking differently: Take 50 mg by mouth daily at bedtime) 180 tablet 1   • Multiple Vitamin (multivitamin) capsule Take by mouth     • [DISCONTINUED] benzonatate (TESSALON) 200 MG capsule Take 1 capsule (200 mg total) by mouth 3 (three) times a day as needed for cough 20 capsule 0   • [DISCONTINUED] Cyanocobalamin (B-12) 1000 MCG SUBL Place 1 tablet (1,000 mcg total) under the tongue in the morning 90 tablet 1   • [DISCONTINUED]  "methylPREDNISolone 4 MG tablet therapy pack Use as directed on package 1 each 0     No current facility-administered medications on file prior to visit.      Social History     Tobacco Use   • Smoking status: Former     Current packs/day: 0.00     Average packs/day: 0.3 packs/day for 10.0 years (2.5 ttl pk-yrs)     Types: Cigarettes     Start date: 1980     Quit date: 1989     Years since quittin.8   • Smokeless tobacco: Never   • Tobacco comments:     tried as a teenager no cigs since   Vaping Use   • Vaping status: Never Used   Substance and Sexual Activity   • Alcohol use: Yes     Alcohol/week: 2.0 standard drinks of alcohol     Types: 2 Cans of beer per week     Comment: a couple of drinks end of day - most days   • Drug use: No   • Sexual activity: Yes     Partners: Male     Birth control/protection: Male Sterilization       Objective   /84 (BP Location: Left arm, Patient Position: Sitting, Cuff Size: Standard)   Pulse 85   Temp 98.5 °F (36.9 °C) (Tympanic)   Ht 5' 5\" (1.651 m)   Wt 91.5 kg (201 lb 12.8 oz)   SpO2 98%   BMI 33.58 kg/m²     Physical Exam  Vitals and nursing note reviewed.   Constitutional:       General: She is not in acute distress.     Appearance: Normal appearance. She is obese. She is not ill-appearing, toxic-appearing or diaphoretic.   HENT:      Head: Normocephalic and atraumatic.      Right Ear: Tympanic membrane, ear canal and external ear normal. There is no impacted cerumen.      Left Ear: Tympanic membrane, ear canal and external ear normal. There is no impacted cerumen.      Nose: Nose normal. No congestion or rhinorrhea.      Mouth/Throat:      Mouth: Mucous membranes are moist.      Pharynx: No oropharyngeal exudate or posterior oropharyngeal erythema.   Eyes:      General: No scleral icterus.        Right eye: No discharge.         Left eye: No discharge.      Extraocular Movements: Extraocular movements intact.      Conjunctiva/sclera: Conjunctivae " normal.      Pupils: Pupils are equal, round, and reactive to light.   Neck:      Vascular: No carotid bruit.   Cardiovascular:      Rate and Rhythm: Normal rate and regular rhythm.      Pulses: Normal pulses.      Heart sounds: Normal heart sounds. No murmur heard.  Pulmonary:      Effort: Pulmonary effort is normal. No respiratory distress.      Breath sounds: Normal breath sounds. No stridor. No wheezing, rhonchi or rales.   Chest:      Chest wall: No tenderness.   Abdominal:      General: Bowel sounds are normal. There is no distension.      Palpations: Abdomen is soft. There is no mass.      Tenderness: There is no abdominal tenderness. There is no right CVA tenderness, left CVA tenderness, guarding or rebound.      Hernia: No hernia is present.   Musculoskeletal:         General: No swelling, tenderness, deformity or signs of injury. Normal range of motion.      Cervical back: Normal range of motion and neck supple. No rigidity or tenderness.      Right lower leg: No edema.      Left lower leg: No edema.   Lymphadenopathy:      Cervical: No cervical adenopathy.   Skin:     General: Skin is warm.      Capillary Refill: Capillary refill takes less than 2 seconds.      Coloration: Skin is not jaundiced or pale.      Findings: No bruising, erythema, lesion or rash.   Neurological:      General: No focal deficit present.      Mental Status: She is alert and oriented to person, place, and time.      Cranial Nerves: No cranial nerve deficit.      Sensory: No sensory deficit.      Motor: No weakness.      Coordination: Coordination normal.      Gait: Gait normal.      Deep Tendon Reflexes: Reflexes normal.   Psychiatric:         Mood and Affect: Mood normal.         Behavior: Behavior normal.         Thought Content: Thought content normal.         Judgment: Judgment normal.

## 2025-02-05 DIAGNOSIS — G43.909 MIGRAINE WITHOUT STATUS MIGRAINOSUS, NOT INTRACTABLE, UNSPECIFIED MIGRAINE TYPE: ICD-10-CM

## 2025-02-05 DIAGNOSIS — F41.9 ANXIETY: ICD-10-CM

## 2025-02-06 RX ORDER — RIZATRIPTAN BENZOATE 10 MG/1
TABLET, ORALLY DISINTEGRATING ORAL
Qty: 27 TABLET | Refills: 9 | OUTPATIENT
Start: 2025-02-06

## 2025-02-06 RX ORDER — LORAZEPAM 0.5 MG/1
0.5 TABLET ORAL DAILY PRN
Qty: 20 TABLET | Refills: 0 | Status: SHIPPED | OUTPATIENT
Start: 2025-02-06

## 2025-02-06 RX ORDER — RIZATRIPTAN BENZOATE 10 MG/1
TABLET, ORALLY DISINTEGRATING ORAL
Qty: 27 TABLET | Refills: 9 | Status: SHIPPED | OUTPATIENT
Start: 2025-02-06

## 2025-02-28 ENCOUNTER — RESULTS FOLLOW-UP (OUTPATIENT)
Dept: FAMILY MEDICINE CLINIC | Facility: CLINIC | Age: 65
End: 2025-02-28

## 2025-02-28 LAB
25(OH)D3 SERPL-MCNC: 36 NG/ML (ref 30–100)
ALBUMIN SERPL-MCNC: 4.3 G/DL (ref 3.6–5.1)
ALBUMIN/GLOB SERPL: 1.9 (CALC) (ref 1–2.5)
ALP SERPL-CCNC: 86 U/L (ref 37–153)
ALT SERPL-CCNC: 20 U/L (ref 6–29)
AST SERPL-CCNC: 18 U/L (ref 10–35)
BASOPHILS # BLD AUTO: 42 CELLS/UL (ref 0–200)
BASOPHILS NFR BLD AUTO: 1.3 %
BILIRUB SERPL-MCNC: 0.5 MG/DL (ref 0.2–1.2)
BUN SERPL-MCNC: 16 MG/DL (ref 7–25)
BUN/CREAT SERPL: ABNORMAL (CALC) (ref 6–22)
CALCIUM SERPL-MCNC: 9 MG/DL (ref 8.6–10.4)
CHLORIDE SERPL-SCNC: 104 MMOL/L (ref 98–110)
CHOLEST SERPL-MCNC: 265 MG/DL
CHOLEST/HDLC SERPL: 2.9 (CALC)
CO2 SERPL-SCNC: 29 MMOL/L (ref 20–32)
CREAT SERPL-MCNC: 0.85 MG/DL (ref 0.5–1.05)
EOSINOPHIL # BLD AUTO: 122 CELLS/UL (ref 15–500)
EOSINOPHIL NFR BLD AUTO: 3.8 %
ERYTHROCYTE [DISTWIDTH] IN BLOOD BY AUTOMATED COUNT: 12.8 % (ref 11–15)
GFR/BSA.PRED SERPLBLD CYS-BASED-ARV: 77 ML/MIN/1.73M2
GLOBULIN SER CALC-MCNC: 2.3 G/DL (CALC) (ref 1.9–3.7)
GLUCOSE SERPL-MCNC: 101 MG/DL (ref 65–99)
HBA1C MFR BLD: 5.6 % OF TOTAL HGB
HCT VFR BLD AUTO: 39.7 % (ref 35–45)
HDLC SERPL-MCNC: 90 MG/DL
HGB BLD-MCNC: 13.3 G/DL (ref 11.7–15.5)
LDLC SERPL CALC-MCNC: 153 MG/DL (CALC)
LYMPHOCYTES # BLD AUTO: 1203 CELLS/UL (ref 850–3900)
LYMPHOCYTES NFR BLD AUTO: 37.6 %
MCH RBC QN AUTO: 30.6 PG (ref 27–33)
MCHC RBC AUTO-ENTMCNC: 33.5 G/DL (ref 32–36)
MCV RBC AUTO: 91.5 FL (ref 80–100)
MONOCYTES # BLD AUTO: 282 CELLS/UL (ref 200–950)
MONOCYTES NFR BLD AUTO: 8.8 %
NEUTROPHILS # BLD AUTO: 1552 CELLS/UL (ref 1500–7800)
NEUTROPHILS NFR BLD AUTO: 48.5 %
NONHDLC SERPL-MCNC: 175 MG/DL (CALC)
PLATELET # BLD AUTO: 155 THOUSAND/UL (ref 140–400)
PMV BLD REES-ECKER: 12.4 FL (ref 7.5–12.5)
POTASSIUM SERPL-SCNC: 4.7 MMOL/L (ref 3.5–5.3)
PROT SERPL-MCNC: 6.6 G/DL (ref 6.1–8.1)
RBC # BLD AUTO: 4.34 MILLION/UL (ref 3.8–5.1)
SODIUM SERPL-SCNC: 140 MMOL/L (ref 135–146)
TRIGL SERPL-MCNC: 108 MG/DL
WBC # BLD AUTO: 3.2 THOUSAND/UL (ref 3.8–10.8)

## 2025-02-28 NOTE — TELEPHONE ENCOUNTER
----- Message from BUCK Story sent at 2/28/2025  8:20 AM EST -----  Pt's cholesterol levels are improving, continue low fat and low carbohydrate diet

## 2025-07-24 ENCOUNTER — RA CDI HCC (OUTPATIENT)
Dept: OTHER | Facility: HOSPITAL | Age: 65
End: 2025-07-24

## 2025-07-31 ENCOUNTER — OFFICE VISIT (OUTPATIENT)
Dept: FAMILY MEDICINE CLINIC | Facility: CLINIC | Age: 65
End: 2025-07-31
Payer: COMMERCIAL

## 2025-07-31 VITALS
BODY MASS INDEX: 34.11 KG/M2 | DIASTOLIC BLOOD PRESSURE: 84 MMHG | RESPIRATION RATE: 18 BRPM | WEIGHT: 205 LBS | TEMPERATURE: 98.6 F | SYSTOLIC BLOOD PRESSURE: 130 MMHG | OXYGEN SATURATION: 99 % | HEART RATE: 79 BPM

## 2025-07-31 DIAGNOSIS — E78.5 DYSLIPIDEMIA: ICD-10-CM

## 2025-07-31 DIAGNOSIS — R03.0 ELEVATED BLOOD PRESSURE READING: ICD-10-CM

## 2025-07-31 DIAGNOSIS — F41.9 ANXIETY: Primary | ICD-10-CM

## 2025-07-31 DIAGNOSIS — J30.2 SEASONAL ALLERGIC RHINITIS, UNSPECIFIED TRIGGER: ICD-10-CM

## 2025-07-31 PROCEDURE — 99213 OFFICE O/P EST LOW 20 MIN: CPT

## 2025-07-31 RX ORDER — MONTELUKAST SODIUM 10 MG/1
10 TABLET ORAL
Qty: 90 TABLET | Refills: 1 | Status: SHIPPED | OUTPATIENT
Start: 2025-07-31